# Patient Record
Sex: MALE | Race: ASIAN | NOT HISPANIC OR LATINO | Employment: FULL TIME | URBAN - METROPOLITAN AREA
[De-identification: names, ages, dates, MRNs, and addresses within clinical notes are randomized per-mention and may not be internally consistent; named-entity substitution may affect disease eponyms.]

---

## 2021-11-18 ENCOUNTER — OFFICE VISIT (OUTPATIENT)
Dept: FAMILY MEDICINE CLINIC | Facility: CLINIC | Age: 61
End: 2021-11-18

## 2021-11-18 VITALS
DIASTOLIC BLOOD PRESSURE: 78 MMHG | HEIGHT: 69 IN | SYSTOLIC BLOOD PRESSURE: 128 MMHG | RESPIRATION RATE: 16 BRPM | BODY MASS INDEX: 27.16 KG/M2 | WEIGHT: 183.4 LBS

## 2021-11-18 DIAGNOSIS — Z12.11 COLON CANCER SCREENING: ICD-10-CM

## 2021-11-18 DIAGNOSIS — D50.9 IRON DEFICIENCY ANEMIA, UNSPECIFIED IRON DEFICIENCY ANEMIA TYPE: ICD-10-CM

## 2021-11-18 DIAGNOSIS — E78.9 LIPID DISORDER: ICD-10-CM

## 2021-11-18 DIAGNOSIS — N40.0 BENIGN PROSTATIC HYPERPLASIA WITHOUT LOWER URINARY TRACT SYMPTOMS: ICD-10-CM

## 2021-11-18 PROCEDURE — 99203 OFFICE O/P NEW LOW 30 MIN: CPT | Performed by: INTERNAL MEDICINE

## 2021-11-18 RX ORDER — MULTIVIT WITH MINERALS/LUTEIN
250 TABLET ORAL DAILY
COMMUNITY

## 2021-11-18 NOTE — PROGRESS NOTES
2021    CC: urinary issues (EST CARE)  .        HPI  Urinary Frequency   This is a new problem. The current episode started more than 1 month ago. The problem occurs intermittently. The patient is experiencing no pain. There has been no fever. Associated symptoms include frequency.        Subjective   Herb Melgar is a 61 y.o. male.      The following portions of the patient's history were reviewed and updated as appropriate: allergies, current medications, past family history, past medical history, past social history, past surgical history and problem list.    Problem List  Patient Active Problem List   Diagnosis   • Bone lesion   • Glenoid labrum tear   • Osteoarthritis, shoulder   • Rotator cuff syndrome of right shoulder   • Shoulder pain       Past Medical History  History reviewed. No pertinent past medical history.    Surgical History  Past Surgical History:   Procedure Laterality Date   • DENTAL PROCEDURE  2019       Family History  Family History   Problem Relation Age of Onset   • Kidney disease Mother    • Diabetes Mother    • Heart disease Father    • Other Sister    • Diabetes Brother    • Hypertension Sister        Social History  Social History    Tobacco Use      Smoking status: Former Smoker        Packs/day: 0.25        Years: 22.00        Pack years: 5.5        Types: Cigarettes        Quit date:         Years since quittin.8      Smokeless tobacco: Never Used      Tobacco comment: 1 pack in two weeks       Is the Patient a current tobacco user? No    Allergies  No Known Allergies    Current Medications    Current Outpatient Medications:   •  vitamin C (ASCORBIC ACID) 250 MG tablet, Take 250 mg by mouth Daily., Disp: , Rfl:   •  Zinc Sulfate (ZINC 15 PO), Take  by mouth., Disp: , Rfl:      Review of System  Review of Systems   Constitutional: Negative.    HENT: Negative.    Eyes: Negative.    Respiratory: Negative.    Cardiovascular: Negative.    Gastrointestinal: Negative.     Genitourinary: Positive for frequency.     I have reviewed and confirmed the accuracy of the ROS as documented by the MA/LPN/RN Damon Santiago MD    Vitals:    11/18/21 1308   BP: 128/78   Resp: 16     Body mass index is 27.08 kg/m².    Objective     Physical Exam  Physical Exam  HENT:      Head: Normocephalic and atraumatic.      Right Ear: Tympanic membrane normal.      Left Ear: Tympanic membrane normal.   Cardiovascular:      Rate and Rhythm: Normal rate and regular rhythm.      Pulses: Normal pulses.      Heart sounds: Normal heart sounds.   Pulmonary:      Effort: Pulmonary effort is normal.      Breath sounds: Normal breath sounds.   Abdominal:      General: Abdomen is flat.   Musculoskeletal:      Cervical back: Normal range of motion and neck supple.         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  He was seen in the past at the [facility].  He relates he has been lost to follow-up for a number of years.  He relates concerns about nocturia times several months.            Diagnoses and all orders for this visit:    1. Iron deficiency anemia, unspecified iron deficiency anemia type  Comments:  Evaluation underway  Orders:  -     Comprehensive Metabolic Panel  -     CBC & Differential  -     Hepatitis C Antibody    2. Lipid disorder  Comments:  Evaluation underway  Orders:  -     Lipid Panel With / Chol / HDL Ratio    3. Benign prostatic hyperplasia without lower urinary tract symptoms  Comments:  Evaluation underway  Orders:  -     PSA DIAGNOSTIC ONLY; Future    4. Colon cancer screening  Comments:  Patient is postdate for colon cancer screening  Orders:  -     Ambulatory Referral For Screening Colonoscopy      Plan:  1.)  Comprehensive metabolic profile  2.)  CBC  3.)  PSA  4.)  Urine dip  5.)  Follow-up in 3 to 4 weeks with complete physical examination.       Damon Santiago MD  11/18/2021

## 2021-11-19 ENCOUNTER — PATIENT ROUNDING (BHMG ONLY) (OUTPATIENT)
Dept: FAMILY MEDICINE CLINIC | Facility: CLINIC | Age: 61
End: 2021-11-19

## 2021-11-19 LAB
ALBUMIN SERPL-MCNC: 4.7 G/DL (ref 3.8–4.9)
ALBUMIN/GLOB SERPL: 1.5 {RATIO} (ref 1.2–2.2)
ALP SERPL-CCNC: 76 IU/L (ref 44–121)
ALT SERPL-CCNC: 35 IU/L (ref 0–44)
AST SERPL-CCNC: 30 IU/L (ref 0–40)
BASOPHILS # BLD AUTO: 0.1 X10E3/UL (ref 0–0.2)
BASOPHILS NFR BLD AUTO: 1 %
BILIRUB SERPL-MCNC: 0.4 MG/DL (ref 0–1.2)
BUN SERPL-MCNC: 12 MG/DL (ref 6–24)
BUN/CREAT SERPL: 13 (ref 9–20)
CALCIUM SERPL-MCNC: 9.5 MG/DL (ref 8.7–10.2)
CHLORIDE SERPL-SCNC: 103 MMOL/L (ref 96–106)
CHOLEST SERPL-MCNC: 253 MG/DL (ref 100–199)
CHOLEST/HDLC SERPL: 4.6 RATIO (ref 0–5)
CO2 SERPL-SCNC: 25 MMOL/L (ref 20–29)
CREAT SERPL-MCNC: 0.91 MG/DL (ref 0.76–1.27)
EOSINOPHIL # BLD AUTO: 0.1 X10E3/UL (ref 0–0.4)
EOSINOPHIL NFR BLD AUTO: 1 %
ERYTHROCYTE [DISTWIDTH] IN BLOOD BY AUTOMATED COUNT: 12.7 % (ref 11.6–15.4)
GLOBULIN SER CALC-MCNC: 3.1 G/DL (ref 1.5–4.5)
GLUCOSE SERPL-MCNC: 93 MG/DL (ref 65–99)
HCT VFR BLD AUTO: 47.1 % (ref 37.5–51)
HCV AB S/CO SERPL IA: <0.1 S/CO RATIO (ref 0–0.9)
HDLC SERPL-MCNC: 55 MG/DL
HGB BLD-MCNC: 15.7 G/DL (ref 13–17.7)
IMM GRANULOCYTES # BLD AUTO: 0 X10E3/UL (ref 0–0.1)
IMM GRANULOCYTES NFR BLD AUTO: 0 %
LDLC SERPL CALC-MCNC: 182 MG/DL (ref 0–99)
LYMPHOCYTES # BLD AUTO: 2 X10E3/UL (ref 0.7–3.1)
LYMPHOCYTES NFR BLD AUTO: 31 %
MCH RBC QN AUTO: 29 PG (ref 26.6–33)
MCHC RBC AUTO-ENTMCNC: 33.3 G/DL (ref 31.5–35.7)
MCV RBC AUTO: 87 FL (ref 79–97)
MONOCYTES # BLD AUTO: 0.5 X10E3/UL (ref 0.1–0.9)
MONOCYTES NFR BLD AUTO: 8 %
NEUTROPHILS # BLD AUTO: 3.7 X10E3/UL (ref 1.4–7)
NEUTROPHILS NFR BLD AUTO: 59 %
PLATELET # BLD AUTO: 222 X10E3/UL (ref 150–450)
POTASSIUM SERPL-SCNC: 4.2 MMOL/L (ref 3.5–5.2)
PROT SERPL-MCNC: 7.8 G/DL (ref 6–8.5)
RBC # BLD AUTO: 5.42 X10E6/UL (ref 4.14–5.8)
SODIUM SERPL-SCNC: 141 MMOL/L (ref 134–144)
TRIGL SERPL-MCNC: 94 MG/DL (ref 0–149)
VLDLC SERPL CALC-MCNC: 16 MG/DL (ref 5–40)
WBC # BLD AUTO: 6.4 X10E3/UL (ref 3.4–10.8)

## 2021-11-19 NOTE — PROGRESS NOTES
November 19, 2021    Hello, may I speak with Herb Melgar?    My name is     I am  with [facility]  [facility] [facility] [address] [phone].    Before we get started may I verify your date of birth? 1960    I am calling to officially welcome you to our practice and ask about your recent visit. Is this a good time to talk? My Chart Message sent    Tell me about your visit with us. What things went well?         We're always looking for ways to make our patients' experiences even better. Do you have recommendations on ways we may improve?      Overall were you satisfied with your first visit to our practice?       I appreciate you taking the time to speak with me today. Is there anything else I can do for you?       Thank you, and have a great day.

## 2022-01-11 ENCOUNTER — PREP FOR SURGERY (OUTPATIENT)
Dept: OTHER | Facility: HOSPITAL | Age: 62
End: 2022-01-11

## 2022-01-11 DIAGNOSIS — Z12.11 SCREEN FOR COLON CANCER: Primary | ICD-10-CM

## 2022-01-20 ENCOUNTER — OFFICE VISIT (OUTPATIENT)
Dept: FAMILY MEDICINE CLINIC | Facility: CLINIC | Age: 62
End: 2022-01-20

## 2022-01-20 VITALS
BODY MASS INDEX: 26.07 KG/M2 | DIASTOLIC BLOOD PRESSURE: 80 MMHG | RESPIRATION RATE: 16 BRPM | HEIGHT: 69 IN | WEIGHT: 176 LBS | SYSTOLIC BLOOD PRESSURE: 128 MMHG

## 2022-01-20 DIAGNOSIS — E78.9 LIPID DISORDER: Primary | ICD-10-CM

## 2022-01-20 DIAGNOSIS — N40.0 BENIGN PROSTATIC HYPERPLASIA WITHOUT LOWER URINARY TRACT SYMPTOMS: ICD-10-CM

## 2022-01-20 PROCEDURE — 99396 PREV VISIT EST AGE 40-64: CPT | Performed by: INTERNAL MEDICINE

## 2022-01-20 NOTE — PROGRESS NOTES
2022    CC: Annual Exam (...no other issues)  .        HPI  This patient presents for physical examination.  He was last seen on 2021       Subjective   Herb Melgar is a 62 y.o. male.      The following portions of the patient's history were reviewed and updated as appropriate: allergies, current medications, past family history, past medical history, past social history, past surgical history and problem list.    Problem List  Patient Active Problem List   Diagnosis   • Bone lesion   • Glenoid labrum tear   • Osteoarthritis, shoulder   • Rotator cuff syndrome of right shoulder   • Shoulder pain       Past Medical History  History reviewed. No pertinent past medical history.    Surgical History  Past Surgical History:   Procedure Laterality Date   • DENTAL PROCEDURE  2019       Family History  Family History   Problem Relation Age of Onset   • Kidney disease Mother    • Diabetes Mother    • Heart disease Father    • Other Sister    • Diabetes Brother    • Hypertension Sister        Social History  Social History    Tobacco Use      Smoking status: Former Smoker        Packs/day: 0.25        Years: 22.00        Pack years: 5.5        Types: Cigarettes        Quit date:         Years since quittin.0      Smokeless tobacco: Never Used      Tobacco comment: 1 pack in two weeks       Is the Patient a current tobacco user? No    Allergies  No Known Allergies    Current Medications    Current Outpatient Medications:   •  vitamin C (ASCORBIC ACID) 250 MG tablet, Take 250 mg by mouth Daily., Disp: , Rfl:   •  Zinc Sulfate (ZINC 15 PO), Take  by mouth., Disp: , Rfl:      Review of System  Review of Systems   Constitutional: Negative.    HENT: Negative.    Eyes: Negative.    Respiratory: Negative.    Cardiovascular: Negative.    Gastrointestinal: Negative.    Endocrine: Negative.    Genitourinary: Negative.    Musculoskeletal: Negative.    Skin: Negative.    Allergic/Immunologic: Negative.   Health Maintenance reviewed.     Neurological: Negative.    Hematological: Negative.    Psychiatric/Behavioral: Negative.      I have reviewed and confirmed the accuracy of the ROS as documented by the MA/LPN/RN Damon Santiago MD    Vitals:    01/20/22 1123   BP: 128/80   Resp: 16     Body mass index is 25.99 kg/m².    Objective     Physical Exam  Physical Exam  Vitals and nursing note reviewed.   Constitutional:       Appearance: He is well-developed.   HENT:      Head: Normocephalic and atraumatic.   Eyes:      Conjunctiva/sclera: Conjunctivae normal.   Cardiovascular:      Rate and Rhythm: Normal rate and regular rhythm.      Heart sounds: Normal heart sounds.   Pulmonary:      Effort: Pulmonary effort is normal.      Breath sounds: Normal breath sounds.   Abdominal:      General: Bowel sounds are normal.      Palpations: Abdomen is soft.   Musculoskeletal:         General: Normal range of motion.      Cervical back: Normal range of motion and neck supple.   Skin:     General: Skin is warm and dry.   Neurological:      Mental Status: He is alert and oriented to person, place, and time.   Psychiatric:         Behavior: Behavior normal.         Assessment/Plan      This pleasant 62-year-old presents for physical examination.  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  This pleasant patient has a PhD in Sh[employer].    We discussed at length his laboratory results from 11/18/2021 in particular his LDL cholesterol was elevated greater than 170.  He started on a low-cholesterol diet and already we have seen evidence of his having loss 7 pounds.  He is due today for a repeat lipid profile.  We discussed his labs consisting of comprehensive metabolic profile and CBC all of which were essentially within normal limits.     Patient had a previous colonoscopy in 2013 which was entirely within normal limits.  Having had no problems with his colon , he is eligible for a repeat colonoscopy in 2023.    Importance of regular  physical exercise was broached.  Our goal is for him to receive at least 30 minutes of regular physical exercise and a 5-day week.  He may be making a change in his employment status soon and may have more time available to accomplish this.    Diagnoses and all orders for this visit:    1. Lipid disorder (Primary)  -     Lipid panel      Plan:  1.)  Lipid profile       Damon Santiago MD  01/20/2022

## 2022-01-21 LAB
CHOLEST SERPL-MCNC: 245 MG/DL (ref 100–199)
HDLC SERPL-MCNC: 56 MG/DL
LDLC SERPL CALC-MCNC: 178 MG/DL (ref 0–99)
TRIGL SERPL-MCNC: 64 MG/DL (ref 0–149)
VLDLC SERPL CALC-MCNC: 11 MG/DL (ref 5–40)

## 2022-01-25 LAB
Lab: NORMAL
PSA SERPL-MCNC: 0.6 NG/ML (ref 0–4)
WRITTEN AUTHORIZATION: NORMAL

## 2022-07-20 ENCOUNTER — OFFICE VISIT (OUTPATIENT)
Dept: FAMILY MEDICINE CLINIC | Facility: CLINIC | Age: 62
End: 2022-07-20

## 2022-07-20 VITALS
HEIGHT: 69 IN | WEIGHT: 177.4 LBS | SYSTOLIC BLOOD PRESSURE: 110 MMHG | BODY MASS INDEX: 26.28 KG/M2 | DIASTOLIC BLOOD PRESSURE: 78 MMHG | RESPIRATION RATE: 16 BRPM

## 2022-07-20 DIAGNOSIS — E78.9 LIPID DISORDER: Primary | ICD-10-CM

## 2022-07-20 PROCEDURE — 99214 OFFICE O/P EST MOD 30 MIN: CPT | Performed by: INTERNAL MEDICINE

## 2022-07-20 NOTE — PROGRESS NOTES
2022    CC: Hyperlipidemia (F/U..No other issues)  .        HPI  This pleasant patient presents at this time for follow-up of hyperlipidemia.       Subjective   Herb Melgar is a 62 y.o. male.      The following portions of the patient's history were reviewed and updated as appropriate: allergies, current medications, past family history, past medical history, past social history, past surgical history and problem list.    Problem List  Patient Active Problem List   Diagnosis   • Bone lesion   • Glenoid labrum tear   • Osteoarthritis, shoulder   • Rotator cuff syndrome of right shoulder   • Shoulder pain       Past Medical History  History reviewed. No pertinent past medical history.    Surgical History  Past Surgical History:   Procedure Laterality Date   • DENTAL PROCEDURE  2019       Family History  Family History   Problem Relation Age of Onset   • Kidney disease Mother    • Diabetes Mother    • Heart disease Father    • Other Sister    • Diabetes Brother    • Hypertension Sister        Social History  Social History    Tobacco Use      Smoking status: Former Smoker        Packs/day: 0.25        Years: 22.00        Pack years: 5.5        Types: Cigarettes        Quit date:         Years since quittin.5      Smokeless tobacco: Never Used      Tobacco comment: 1 pack in two weeks       Is the Patient a current tobacco user? No    Allergies  No Known Allergies    Current Medications    Current Outpatient Medications:   •  vitamin C (ASCORBIC ACID) 250 MG tablet, Take 250 mg by mouth Daily., Disp: , Rfl:   •  Zinc Sulfate (ZINC 15 PO), Take  by mouth., Disp: , Rfl:      Review of System  Review of Systems   Constitutional: Negative.    HENT: Negative.    Eyes: Negative.    Respiratory: Negative.    Cardiovascular: Negative.    Gastrointestinal: Negative.    Endocrine: Negative.    Genitourinary: Negative.      I have reviewed and confirmed the accuracy of the ROS as documented by the MA/LPN/RN  Damon Santiago MD    Vitals:    07/20/22 0815   BP: 110/78   Resp: 16     Body mass index is 26.2 kg/m².    Objective     Physical Exam  Physical Exam  HENT:      Head: Normocephalic.   Cardiovascular:      Rate and Rhythm: Normal rate and regular rhythm.      Pulses: Normal pulses.      Heart sounds: Normal heart sounds.   Pulmonary:      Effort: Pulmonary effort is normal.      Breath sounds: Normal breath sounds.   Musculoskeletal:      Cervical back: Normal range of motion.   Neurological:      General: No focal deficit present.      Mental Status: He is alert and oriented to person, place, and time.         Assessment & Plan      This pleasant patient presents at this time for follow-up for hyperlipidemia.  He was last seen in January 2022 [place] found that hisLDL was very elevated at 178.  His previous level was also elevated at 182.  Patient triglycerides however were normal at 64.  He relates that he is not quite sure if he was truly fasting at that visit.    Patient is very active playing pickle ball and tennis several days a week.  He has had no chest pain or shortness of breath..  Diagnoses and all orders for this visit:    1. Lipid disorder (Primary)  -     Lipid Panel         Plan:  1.)  Fasting lipid level  2.)  Schedule for Medicare wellness review review after January 23.    Damon Santiago MD  07/20/2022  Answers for HPI/ROS submitted by the patient on 7/20/2022  What is the primary reason for your visit?: Other  Please describe your symptoms.: Follow up with my physical check-up. Check on my cholesterol  Have you had these symptoms before?: No  How long have you been having these symptoms?: 1-4 days  Please list any medications you are currently taking for this condition.: Allegra, vitamin c  Please describe any probable cause for these symptoms. : N/A

## 2022-07-21 LAB
CHOLEST SERPL-MCNC: 227 MG/DL (ref 100–199)
HDLC SERPL-MCNC: 59 MG/DL
LDLC SERPL CALC-MCNC: 153 MG/DL (ref 0–99)
TRIGL SERPL-MCNC: 85 MG/DL (ref 0–149)
VLDLC SERPL CALC-MCNC: 15 MG/DL (ref 5–40)

## 2022-09-29 ENCOUNTER — TELEPHONE (OUTPATIENT)
Dept: FAMILY MEDICINE CLINIC | Facility: CLINIC | Age: 62
End: 2022-09-29

## 2022-09-29 NOTE — TELEPHONE ENCOUNTER
Caller: Herb Melgar    Relationship to patient: Self    Best call back number: [phone]    Date of exposure: UNKNOWN    Date of positive COVID19 test: 09/28    Date if possible COVID19 exposure: UNKNOWN    COVID19 symptoms: COUGH, SOME DRAINAGE     Date of initial quarantine: 09/28    Additional information or concerns: PATIENT WANTED TO LET DR CHUNG KNOW HE TESTED POSITIVE FOR COVID.  SO FAR IS HAVING MILD SYMPTOMS THAT STARTED YESTERDAY.    PATIENT MAY NEED TO HAVE A DOCTORS NOTE TO RETURN TO WORK.    PATIENT PLANS TO QUARANTINE UNTIL OCT 3 AND IF NOT OTHER SYMPTOMS BEGIN TO PROLONG QUARANTINE HE [LANS TO RETURN TO WORK BY OCT 4.    What is the patients preferred pharmacy: [facility] [id number] - [place] [address] [address] AT [facility] [address] - [phone] [facility] [phone]   [phone]

## 2023-07-25 ENCOUNTER — CLINICAL SUPPORT (OUTPATIENT)
Dept: FAMILY MEDICINE CLINIC | Facility: CLINIC | Age: 63
End: 2023-07-25
Payer: COMMERCIAL

## 2023-07-25 DIAGNOSIS — Z23 NEED FOR TDAP VACCINATION: Primary | ICD-10-CM

## 2023-07-25 PROCEDURE — 90471 IMMUNIZATION ADMIN: CPT | Performed by: INTERNAL MEDICINE

## 2023-07-25 PROCEDURE — 90715 TDAP VACCINE 7 YRS/> IM: CPT | Performed by: INTERNAL MEDICINE

## 2023-08-08 ENCOUNTER — PREP FOR SURGERY (OUTPATIENT)
Dept: OTHER | Facility: HOSPITAL | Age: 63
End: 2023-08-08
Payer: COMMERCIAL

## 2023-08-08 DIAGNOSIS — Z12.11 SCREENING FOR MALIGNANT NEOPLASM OF COLON: Primary | ICD-10-CM

## 2023-08-11 PROBLEM — Z12.11 SCREENING FOR MALIGNANT NEOPLASM OF COLON: Status: ACTIVE | Noted: 2023-08-11

## 2023-08-31 ENCOUNTER — OFFICE VISIT (OUTPATIENT)
Dept: FAMILY MEDICINE CLINIC | Facility: CLINIC | Age: 63
End: 2023-08-31
Payer: COMMERCIAL

## 2023-08-31 VITALS
BODY MASS INDEX: 26.51 KG/M2 | DIASTOLIC BLOOD PRESSURE: 78 MMHG | HEIGHT: 69 IN | SYSTOLIC BLOOD PRESSURE: 120 MMHG | WEIGHT: 179 LBS

## 2023-08-31 DIAGNOSIS — R16.0 HYPODENSE MASS OF LIVER: ICD-10-CM

## 2023-08-31 DIAGNOSIS — L98.9 SKIN LESION: Primary | ICD-10-CM

## 2023-08-31 DIAGNOSIS — Z13.220 SCREENING FOR HYPERLIPIDEMIA: ICD-10-CM

## 2023-08-31 PROCEDURE — 99214 OFFICE O/P EST MOD 30 MIN: CPT | Performed by: INTERNAL MEDICINE

## 2023-08-31 RX ORDER — ASPIRIN 81 MG/1
TABLET ORAL
Qty: 90 TABLET | Refills: 3 | Status: SHIPPED | OUTPATIENT
Start: 2023-08-31

## 2023-08-31 RX ORDER — ROSUVASTATIN CALCIUM 10 MG/1
40 TABLET, COATED ORAL DAILY
Qty: 90 TABLET | Refills: 3 | Status: SHIPPED | OUTPATIENT
Start: 2023-08-31

## 2023-08-31 NOTE — PROGRESS NOTES
08/31/2023    Assessment & Plan   This 63-year-old presents today for discussion regarding his calcium CT scan which was done On 7/17/2023.  The results of the scan showed that the patient had 4 vessels in law involved with possible calcium his total score was a A2 N4.  The total Agatston CAC score was 287 with 86 adjusted CAC score percentile of 60.    The vessel scoring showed left main 59, , circumflex 25 with a RCA of 71.  Recommendation is for moderate to high intensity statin plus aspirin at 81 mg the risk is moderately increased.    We discussed this for 30 minutes with the patient.  We will repeat his cholesterol in 4 to 6 weeks.  Incidental note was made of a 1.5 hepatic hypodensity which was felt to be slightly greater than fluid attenuation the feeling from radiologist that this may represent slightly complex cyst but we will pursue this more with a liver MRI.      Diagnoses and all orders for this visit:    1. Skin lesion (Primary)  -     Ambulatory Referral to Dermatology    2. Hypodense mass of liver  -     MRI abdomen w wo contrast; Future    3. Screening for hyperlipidemia  -     Lipid Panel; Future    Other orders  -     rosuvastatin (Crestor) 10 MG tablet; Take 4 tablets by mouth Daily.  Dispense: 90 tablet; Refill: 3  -     aspirin 81 MG EC tablet; 1 tablet after breakfast  Dispense: 90 tablet; Refill: 3      Plan:  1.)  Crestor 10 mg 1 tab p.o. every daily  2.)  Aspirin 81 mg 1 tab p.o. every morning  3.)  Repeat cholesterol in 6 to 8 weeks  4.)  MRI of the abdomen to evaluate possible hepatic cyst       CC: Hyperlipidemia (F/U.  Spot on left forearm---no other issues)  .        HPI  History of Present Illness     Subjective   Herb Melgar is a 63 y.o. male.      The following portions of the patient's history were reviewed and updated as appropriate: allergies, current medications, past family history, past medical history, past social history, past surgical history, and problem  list.    Problem List  Patient Active Problem List   Diagnosis    Bone lesion    Glenoid labrum tear    Osteoarthritis, shoulder    Rotator cuff syndrome of right shoulder    Shoulder pain    Screening for malignant neoplasm of colon       Past Medical History  Past Medical History:   Diagnosis Date    Hyperlipidemia        Surgical History  Past Surgical History:   Procedure Laterality Date    COLONOSCOPY  2013    Dr. Jarad Howe completed the Colonoscopy Report ([facility])    COLONOSCOPY N/A 2023    Procedure: COLONOSCOPY to cecum with cold forcep polypectomies;  Surgeon: Costa Painting MD;  Location: [facility] ENDOSCOPY;  Service: General;  Laterality: N/A;  pre- screening  post- polyps, hemorrhoids    DENTAL PROCEDURE         Family History  Family History   Problem Relation Age of Onset    Kidney disease Mother         My mom will be 90 years old this August with renal failure    Diabetes Mother     Heart disease Father          last July 3, 1969    Other Sister         Brain Tumor    Diabetes Brother         Youngest brother    Hypertension Sister         She has maintenance meds. Aby is my younger suster    Diabetes Maternal Grandfather         He  with diabetes complications. He was 69 years old    Diabetes Brother         He also has high cholesterol issues    Heart disease Brother         Recently  3 months ago, 2023 (58 years old) due to massive heart attack    Heart disease Maternal Aunt         Deaseased due to heart failure    Kidney disease Maternal Uncle         He was on dialysis. He  when he was 57 years old       Social History  Social History    Tobacco Use      Smoking status: Former        Packs/day: 0.25        Years: 22.00        Pack years: 5.5        Types: Cigarettes        Start date: 1980        Quit date: 2002        Years since quittin.7      Smokeless tobacco: Never      Tobacco comments: 1 pack in two weeks       Is  the Patient a current tobacco user? No    Allergies  No Known Allergies    Current Medications    Current Outpatient Medications:     aspirin 81 MG EC tablet, 1 tablet after breakfast, Disp: 90 tablet, Rfl: 3    rosuvastatin (Crestor) 10 MG tablet, Take 4 tablets by mouth Daily., Disp: 90 tablet, Rfl: 3     Review of System  Review of Systems  I have reviewed and confirmed the accuracy of the ROS as documented by the MA/LPN/RN Damon Santiago MD    Vitals:    08/31/23 1132   BP: 120/78     Body mass index is 26.43 kg/m².    Objective     Physical Exam  Physical Exam        Damon Santiago MD  08/31/2023

## 2023-09-12 ENCOUNTER — ANESTHESIA EVENT (OUTPATIENT)
Dept: GASTROENTEROLOGY | Facility: HOSPITAL | Age: 63
End: 2023-09-12
Payer: COMMERCIAL

## 2023-09-12 ENCOUNTER — HOSPITAL ENCOUNTER (OUTPATIENT)
Facility: HOSPITAL | Age: 63
Setting detail: HOSPITAL OUTPATIENT SURGERY
Discharge: HOME | End: 2023-09-12
Attending: SURGERY | Admitting: SURGERY
Payer: COMMERCIAL

## 2023-09-12 ENCOUNTER — ANESTHESIA (OUTPATIENT)
Dept: GASTROENTEROLOGY | Facility: HOSPITAL | Age: 63
End: 2023-09-12
Payer: COMMERCIAL

## 2023-09-12 VITALS
DIASTOLIC BLOOD PRESSURE: 73 MMHG | WEIGHT: 176 LBS | OXYGEN SATURATION: 99 % | RESPIRATION RATE: 18 BRPM | HEIGHT: 69 IN | SYSTOLIC BLOOD PRESSURE: 109 MMHG | BODY MASS INDEX: 26.07 KG/M2 | HEART RATE: 70 BPM

## 2023-09-12 DIAGNOSIS — Z12.11 SCREENING FOR MALIGNANT NEOPLASM OF COLON: ICD-10-CM

## 2023-09-12 PROCEDURE — 88305 TISSUE EXAM BY PATHOLOGIST: CPT | Performed by: SURGERY

## 2023-09-12 PROCEDURE — 45380 COLONOSCOPY AND BIOPSY: CPT | Performed by: SURGERY

## 2023-09-12 PROCEDURE — 25010000002 PROPOFOL 10 MG/ML EMULSION: Performed by: NURSE ANESTHETIST, CERTIFIED REGISTERED

## 2023-09-12 RX ORDER — FLUMAZENIL 0.1 MG/ML
0.2 INJECTION INTRAVENOUS AS NEEDED
Status: DISCONTINUED | OUTPATIENT
Start: 2023-09-12 | End: 2023-09-12 | Stop reason: HOSPADM

## 2023-09-12 RX ORDER — PROPOFOL 10 MG/ML
VIAL (ML) INTRAVENOUS AS NEEDED
Status: DISCONTINUED | OUTPATIENT
Start: 2023-09-12 | End: 2023-09-12 | Stop reason: SURG

## 2023-09-12 RX ORDER — HYDRALAZINE HYDROCHLORIDE 20 MG/ML
10 INJECTION INTRAMUSCULAR; INTRAVENOUS
Status: DISCONTINUED | OUTPATIENT
Start: 2023-09-12 | End: 2023-09-12 | Stop reason: HOSPADM

## 2023-09-12 RX ORDER — FENTANYL CITRATE 50 UG/ML
25 INJECTION, SOLUTION INTRAMUSCULAR; INTRAVENOUS
Status: DISCONTINUED | OUTPATIENT
Start: 2023-09-12 | End: 2023-09-12 | Stop reason: HOSPADM

## 2023-09-12 RX ORDER — DROPERIDOL 2.5 MG/ML
0.62 INJECTION, SOLUTION INTRAMUSCULAR; INTRAVENOUS ONCE AS NEEDED
Status: DISCONTINUED | OUTPATIENT
Start: 2023-09-12 | End: 2023-09-12 | Stop reason: HOSPADM

## 2023-09-12 RX ORDER — LIDOCAINE HYDROCHLORIDE 20 MG/ML
INJECTION, SOLUTION INFILTRATION; PERINEURAL AS NEEDED
Status: DISCONTINUED | OUTPATIENT
Start: 2023-09-12 | End: 2023-09-12 | Stop reason: SURG

## 2023-09-12 RX ORDER — DIPHENHYDRAMINE HYDROCHLORIDE 50 MG/ML
12.5 INJECTION INTRAMUSCULAR; INTRAVENOUS
Status: DISCONTINUED | OUTPATIENT
Start: 2023-09-12 | End: 2023-09-12 | Stop reason: HOSPADM

## 2023-09-12 RX ORDER — EPHEDRINE SULFATE 50 MG/ML
10 INJECTION, SOLUTION INTRAVENOUS ONCE AS NEEDED
Status: DISCONTINUED | OUTPATIENT
Start: 2023-09-12 | End: 2023-09-12 | Stop reason: HOSPADM

## 2023-09-12 RX ORDER — ONDANSETRON 2 MG/ML
4 INJECTION INTRAMUSCULAR; INTRAVENOUS ONCE AS NEEDED
Status: DISCONTINUED | OUTPATIENT
Start: 2023-09-12 | End: 2023-09-12 | Stop reason: HOSPADM

## 2023-09-12 RX ORDER — SODIUM CHLORIDE, SODIUM LACTATE, POTASSIUM CHLORIDE, CALCIUM CHLORIDE 600; 310; 30; 20 MG/100ML; MG/100ML; MG/100ML; MG/100ML
30 INJECTION, SOLUTION INTRAVENOUS CONTINUOUS PRN
Status: DISCONTINUED | OUTPATIENT
Start: 2023-09-12 | End: 2023-09-12 | Stop reason: HOSPADM

## 2023-09-12 RX ORDER — PROPOFOL 10 MG/ML
VIAL (ML) INTRAVENOUS CONTINUOUS PRN
Status: DISCONTINUED | OUTPATIENT
Start: 2023-09-12 | End: 2023-09-12 | Stop reason: SURG

## 2023-09-12 RX ORDER — NALOXONE HCL 0.4 MG/ML
0.2 VIAL (ML) INJECTION AS NEEDED
Status: DISCONTINUED | OUTPATIENT
Start: 2023-09-12 | End: 2023-09-12 | Stop reason: HOSPADM

## 2023-09-12 RX ORDER — LABETALOL HYDROCHLORIDE 5 MG/ML
10 INJECTION, SOLUTION INTRAVENOUS
Status: DISCONTINUED | OUTPATIENT
Start: 2023-09-12 | End: 2023-09-12 | Stop reason: HOSPADM

## 2023-09-12 RX ORDER — SODIUM CHLORIDE 9 MG/ML
40 INJECTION, SOLUTION INTRAVENOUS AS NEEDED
Status: DISCONTINUED | OUTPATIENT
Start: 2023-09-12 | End: 2023-09-12 | Stop reason: HOSPADM

## 2023-09-12 RX ORDER — SODIUM CHLORIDE 0.9 % (FLUSH) 0.9 %
10 SYRINGE (ML) INJECTION EVERY 12 HOURS SCHEDULED
Status: DISCONTINUED | OUTPATIENT
Start: 2023-09-12 | End: 2023-09-12 | Stop reason: HOSPADM

## 2023-09-12 RX ORDER — FENTANYL CITRATE 50 UG/ML
50 INJECTION, SOLUTION INTRAMUSCULAR; INTRAVENOUS
Status: DISCONTINUED | OUTPATIENT
Start: 2023-09-12 | End: 2023-09-12 | Stop reason: HOSPADM

## 2023-09-12 RX ORDER — SODIUM CHLORIDE 0.9 % (FLUSH) 0.9 %
10 SYRINGE (ML) INJECTION AS NEEDED
Status: DISCONTINUED | OUTPATIENT
Start: 2023-09-12 | End: 2023-09-12 | Stop reason: HOSPADM

## 2023-09-12 RX ADMIN — SODIUM CHLORIDE, POTASSIUM CHLORIDE, SODIUM LACTATE AND CALCIUM CHLORIDE 30 ML/HR: 600; 310; 30; 20 INJECTION, SOLUTION INTRAVENOUS at 09:38

## 2023-09-12 RX ADMIN — Medication 100 MG: at 10:41

## 2023-09-12 RX ADMIN — PROPOFOL 180 MCG/KG/MIN: 10 INJECTION, EMULSION INTRAVENOUS at 10:41

## 2023-09-12 RX ADMIN — LIDOCAINE HYDROCHLORIDE 60 MG: 20 INJECTION, SOLUTION INFILTRATION; PERINEURAL at 10:41

## 2023-09-12 NOTE — OP NOTE
Colonoscopy Procedure Note  Herb Melgar  1960  Date of Procedure: 09/12/23    Pre-operative Diagnosis:    Encounter for screening colonoscopy    Post-operative Diagnosis:  Descending colon polyp x2, grade 1 nonbleeding internal hemorrhoids    Procedure: Colonoscopy to cecum with cold forcep biopsy of descending colon polyp x2    Findings/Treatments:   2 separate sessile 1 to 2 mm polyps in descending colon       Recommendations:   7 to 10 years pending final pathology.  I will call in 3 to 10 days with a final recommendation.    Surgeon: Costa Painting MD    Anesthetic: MAC per Derek Dewitt MD      Procedure Details:    MAC anesthesia was induced.  A digital rectal exam was performed along with visual inspection of the anus. The 180 Colonoscope was inserted into the rectum and advanced to the cecum, with relative ease.  Cecum was identified by the appendiceal orifice and the ileocecal valve and photographed for documentation.       A careful inspection was made as the scope was withdrawn, including a retroflexed view of the rectum.  In the descending colon there were 2 separate sessile polyps identified.  These were tiny in nature measuring 1 to 2 mm.  They were removed in their entirety with cold forcep. Retroflexion in the rectum revealed grade 1 nonbleeding internal hemorrhoids. Prep quality was excellent.      Costa Painting M.D.  General, Endoscopic, and Robotic Surgery  [facility] ([facility]    [facility], [address]  [place], [place], [address]  P: [phone]  F: [phone]

## 2023-09-12 NOTE — DISCHARGE INSTRUCTIONS

## 2023-09-12 NOTE — ANESTHESIA POSTPROCEDURE EVALUATION
"Patient: Herb Melgar    Procedure Summary       Date: 09/12/23 Room / Location: [facility] ENDOSCOPY 1 /  [facility]    Anesthesia Start: 1038 Anesthesia Stop: 1103    Procedure: COLONOSCOPY to cecum with cold forcep polypectomies Diagnosis:       Screening for malignant neoplasm of colon      (Screening for malignant neoplasm of colon [Z12.11])    Surgeons: Costa Painting MD Provider: Derek Dewitt MD    Anesthesia Type: MAC ASA Status: 2            Anesthesia Type: MAC    Vitals  Vitals Value Taken Time   BP 96/67 09/12/23 1115   Temp     Pulse 67 09/12/23 1115   Resp 16 09/12/23 1115   SpO2 96 % 09/12/23 1115           Post Anesthesia Care and Evaluation    Patient location during evaluation: bedside  Pain management: adequate    Airway patency: patent  Anesthetic complications: No anesthetic complications    Cardiovascular status: acceptable  Respiratory status: acceptable  Hydration status: acceptable    Comments: *BP 96/67   Pulse 67   Resp 16   Ht 175.3 cm (69\")   Wt 79.8 kg (176 lb)   SpO2 96%   BMI 25.99 kg/m²       "

## 2023-09-12 NOTE — H&P
SURGERY  Herb VIDAL Melgar  1960    09/12/23    HPI: 63 y.o. male here for screening colonoscopy.  Last reported colonoscopy in .  He denies history of polyps or family history of colon cancer.    Past Medical History:   Diagnosis Date    Hyperlipidemia        Past Surgical History:   Procedure Laterality Date    COLONOSCOPY  2013    Dr. Jarad Howe completed the Colonoscopy Report ([facility])    DENTAL PROCEDURE  2019       has No Known Allergies.       Medication List        ASK your doctor about these medications      aspirin 81 MG EC tablet  1 tablet after breakfast     rosuvastatin 10 MG tablet  Commonly known as: Crestor  Take 4 tablets by mouth Daily.              Family History   Problem Relation Age of Onset    Kidney disease Mother         My mom will be 90 years old this August with renal failure    Diabetes Mother     Heart disease Father          last July 3, 1969    Other Sister         Brain Tumor    Diabetes Brother         Youngest brother    Hypertension Sister         She has maintenance meds. Aby is my younger suster    Diabetes Maternal Grandfather         He  with diabetes complications. He was 69 years old    Diabetes Brother         He also has high cholesterol issues    Heart disease Brother         Recently  3 months ago, 2023 (58 years old) due to massive heart attack    Heart disease Maternal Aunt         Deaseased due to heart failure    Kidney disease Maternal Uncle         He was on dialysis. He  when he was 57 years old       Social History     Socioeconomic History    Marital status:    Tobacco Use    Smoking status: Former     Packs/day: 0.25     Years: 22.00     Pack years: 5.50     Types: Cigarettes     Start date: 1980     Quit date: 2002     Years since quittin.7    Smokeless tobacco: Never    Tobacco comments:     1 pack in two weeks   Substance and Sexual Activity    Alcohol use: Yes     Alcohol/week:  3.0 standard drinks     Types: 2 Cans of beer, 1 Shots of liquor per week    Drug use: Never    Sexual activity: Yes     Partners: Female     Birth control/protection: Condom       Vitals:    09/12/23 0930   BP: 131/79   Pulse: 63   Resp: 16   SpO2: 98%       Body mass index is 25.99 kg/m².    Physical Exam    General: No acute distress  Lungs: No labored breathing, Pulse oximetry on room air is 98%.  Heart: RRR  Abdo: Soft  Mental:  Awake, alert, and oriented      Assessment/Plan  Encounter for Screening/Surveillance Colonoscopy  Proceed with screening colonoscopy.  Risk, benefits discussed including risk of perforation, bleeding.    Costa Painting MD  10:36 EDT

## 2023-09-12 NOTE — ANESTHESIA PREPROCEDURE EVALUATION
Anesthesia Evaluation     no history of anesthetic complications:   NPO Solid Status: > 8 hours  NPO Liquid Status: > 2 hours           Airway   Mallampati: II  Neck ROM: full  no difficulty expected  Dental - normal exam     Pulmonary - normal exam   (+) a smoker Former,  (-) COPD, asthma, sleep apnea    PE comment: nonlabored  Cardiovascular - normal exam  Exercise tolerance: good (4-7 METS)    Rhythm: regular  Rate: normal    (+) hyperlipidemia  (-) hypertension, valvular problems/murmurs, past MI, CAD, dysrhythmias, angina      Neuro/Psych- negative ROS  (-) seizures, TIA, CVA  GI/Hepatic/Renal/Endo - negative ROS   (-) GERD, liver disease, no renal disease, diabetes, no thyroid disorder    Musculoskeletal     (+) arthralgias  Abdominal    Substance History      OB/GYN          Other   arthritis,                   Anesthesia Plan    ASA 2     MAC       Anesthetic plan, risks, benefits, and alternatives have been provided, discussed and informed consent has been obtained with: patient.    CODE STATUS:

## 2023-09-14 LAB
LAB AP CASE REPORT: NORMAL
LAB AP DIAGNOSIS COMMENT: NORMAL
PATH REPORT.FINAL DX SPEC: NORMAL
PATH REPORT.GROSS SPEC: NORMAL

## 2023-09-15 ENCOUNTER — TELEPHONE (OUTPATIENT)
Dept: SURGERY | Facility: CLINIC | Age: 63
End: 2023-09-15
Payer: COMMERCIAL

## 2023-09-18 ENCOUNTER — TELEPHONE (OUTPATIENT)
Dept: FAMILY MEDICINE CLINIC | Facility: CLINIC | Age: 63
End: 2023-09-18

## 2023-09-18 NOTE — TELEPHONE ENCOUNTER
Caller: Herb Melgar    Relationship: Self    Best call back number: [phone]    What medications are you currently taking:   Current Outpatient Medications on File Prior to Visit   Medication Sig Dispense Refill    aspirin 81 MG EC tablet 1 tablet after breakfast 90 tablet 3    rosuvastatin (Crestor) 10 MG tablet Take 4 tablets by mouth Daily. 90 tablet 3     No current facility-administered medications on file prior to visit.      When did you start taking these medications: 9/1/23 ( HAS 5 DAYS LEFT ON HAND )     Which medication are you concerned about: rosuvastatin (Crestor) 10 MG tablet     Who prescribed you this medication: DR ARIANA CHUNG     What are your concerns: FOAMY URINE     How long have you had these concerns: A COUPLE OF WEEKS    PATIENT IS OF  DECENT FROM [facility]- NOT SURE IF THAT CORRELATES AT ALL TO THE FOAM IN THE URINE.     PATIENT IS STILL TAKING HIS DOSES AS NORMAL, AWAITING OUR CALL.     PLEASE GIVE PATIENT A CALLBACK.

## 2023-09-20 DIAGNOSIS — Z13.6 SCREENING FOR HYPERTENSION: Primary | ICD-10-CM

## 2023-09-21 ENCOUNTER — TELEPHONE (OUTPATIENT)
Dept: FAMILY MEDICINE CLINIC | Facility: CLINIC | Age: 63
End: 2023-09-21
Payer: COMMERCIAL

## 2023-09-22 NOTE — TELEPHONE ENCOUNTER
I called the patient to check on his condition and to see if he was able to get laboratory test done as we had requested.  The call however went to voicemail and the voicemail was full.

## 2023-09-27 DIAGNOSIS — M62.82 NON-TRAUMATIC RHABDOMYOLYSIS: Primary | ICD-10-CM

## 2023-10-12 ENCOUNTER — LAB (OUTPATIENT)
Dept: FAMILY MEDICINE CLINIC | Facility: CLINIC | Age: 63
End: 2023-10-12
Payer: COMMERCIAL

## 2023-10-12 DIAGNOSIS — Z13.220 SCREENING FOR HYPERLIPIDEMIA: ICD-10-CM

## 2023-10-15 DIAGNOSIS — E78.5 HYPERLIPIDEMIA, UNSPECIFIED HYPERLIPIDEMIA TYPE: Primary | ICD-10-CM

## 2023-11-06 ENCOUNTER — OFFICE VISIT (OUTPATIENT)
Dept: CARDIOLOGY | Facility: CLINIC | Age: 63
End: 2023-11-06
Payer: COMMERCIAL

## 2023-11-06 VITALS
BODY MASS INDEX: 26.07 KG/M2 | WEIGHT: 176 LBS | DIASTOLIC BLOOD PRESSURE: 82 MMHG | HEIGHT: 69 IN | HEART RATE: 67 BPM | SYSTOLIC BLOOD PRESSURE: 136 MMHG

## 2023-11-06 DIAGNOSIS — E78.5 HYPERLIPIDEMIA LDL GOAL <100: Primary | ICD-10-CM

## 2023-11-06 DIAGNOSIS — Z78.9 STATIN INTOLERANCE: ICD-10-CM

## 2023-11-06 PROCEDURE — 99203 OFFICE O/P NEW LOW 30 MIN: CPT | Performed by: INTERNAL MEDICINE

## 2023-11-06 PROCEDURE — 93000 ELECTROCARDIOGRAM COMPLETE: CPT | Performed by: INTERNAL MEDICINE

## 2023-11-06 RX ORDER — BEMPEDOIC ACID AND EZETIMIBE 180; 10 MG/1; MG/1
1 TABLET, FILM COATED ORAL DAILY
Qty: 30 TABLET | Refills: 6 | Status: SHIPPED | OUTPATIENT
Start: 2023-11-06

## 2023-11-06 NOTE — PROGRESS NOTES
Subjective:        [facility]  Cardiology Consult Note    Patient Identification:  Name: Herb Melgar  Age: 63 y.o.  Sex: male  :  1960  MRN: [id number]             CC  HYPERLIPIDEMIA  STOPPED DUE TO HIGH CHOL  F/H      History of Present Illness:   63-year-old male with hyperlipidemia statin intolerance here for the cardiac evaluation as well as establishment of the care also has a strong family history for atherosclerosis    Comorbid cardiac risk factors:     Past Medical History:  Past Medical History:   Diagnosis Date    Hyperlipidemia      Past Surgical History:  Past Surgical History:   Procedure Laterality Date    COLONOSCOPY  2013    Dr. Jarad Howe completed the Colonoscopy Report (LEC)    COLONOSCOPY N/A 2023    Procedure: COLONOSCOPY to cecum with cold forcep polypectomies;  Surgeon: Costa Painting MD;  Location: [facility] ENDOSCOPY;  Service: General;  Laterality: N/A;  pre- screening  post- polyps, hemorrhoids    DENTAL PROCEDURE        Allergies:  No Known Allergies  Home Meds:  (Not in a hospital admission)    Current Meds:     Current Outpatient Medications:     aspirin 81 MG EC tablet, 1 tablet after breakfast, Disp: 90 tablet, Rfl: 3    Bempedoic Acid-Ezetimibe (Nexlizet) 180-10 MG tablet, Take 1 tablet by mouth Daily., Disp: 30 tablet, Rfl: 6  Social History:   Social History     Tobacco Use    Smoking status: Former     Packs/day: 0.25     Years: 22.00     Additional pack years: 0.00     Total pack years: 5.50     Types: Cigarettes     Start date: 1980     Quit date: 2002     Years since quittin.8    Smokeless tobacco: Never    Tobacco comments:     1 pack in two weeks   Substance Use Topics    Alcohol use: Yes     Alcohol/week: 3.0 standard drinks of alcohol     Types: 2 Cans of beer, 1 Shots of liquor per week      Family History:  Family History   Problem Relation Age of Onset    Kidney disease Mother         My mom is 90 years  old this with renal disease    Diabetes Mother     Heart disease Father          last July 3, 1969    Other Sister         Brain Tumor ( benign)    Diabetes Brother         Youngest brother    Hypertension Sister         She has maintenance meds. Aby is my younger sister    Diabetes Maternal Grandfather         He  with diabetes complications. He was 69 years old    Diabetes Brother         He also has high cholesterol issues    Heart disease Brother         Recently  2023 (58 years old) due to cardiac arrest    Heart disease Maternal Aunt          due to heart failure. She was 75    Kidney disease Maternal Uncle         He was on dialysis. He  when he was 57 years old        Review of Systems    Constitutional: No weakness,fatigue, fever, rigors, chills   Eyes: No vision changes, eye pain   ENT/oropharynx: No difficulty swallowing, sore throat, epistaxis, changes in hearing   Cardiovascular: No chest pain, chest tightness, palpitations, paroxysmal nocturnal dyspnea, orthopnea, diaphoresis, dizziness / syncopal episode   Respiratory: No shortness of breath, dyspnea on exertion, cough, wheezing hemoptysis   Gastrointestinal: No abdominal pain, nausea, vomiting, diarrhea, bloody stools   Genitourinary: No hematuria, dysuria   Neurological: No headache, tremors, numbness,  one-sided weakness    Musculoskeletal: No cramps, myalgias,  joint pain, joint swelling   Integument: No rash, edema           Constitutional:       Physical Exam   General:  Appears in no acute distress  Eyes: PERTL,  HEENT:  No JVD. Thyroid not visibly enlarged. No mucosal pallor or cyanosis  Respiratory: Respirations regular and unlabored at rest. BBS with good air entry in all fields. No crackles, rubs or wheezes auscultated  Cardiovascular: S1S2 Regular rate and rhythm. No murmur, rub or gallop auscultated. No carotid bruits. DP/PT pulses    . No pretibial pitting edema  Gastrointestinal: Abdomen  soft, flat, non tender. Bowel sounds present. No hepatosplenomegaly. No ascites  Musculoskeletal: GIPSON x4. No abnormal movements  Extremities: No digital clubbing or cyanosis  Skin: Color pink. Skin warm and dry to touch. No rashes  No xanthoma  Neuro: AAO x3 CN II-XII grossly intact            ECG 12 Lead    Date/Time: 11/6/2023 1:02 PM  Performed by: Olga North MD    Authorized by: Olga North MD  Comparison: compared with previous ECG   Similar to previous ECG  Rhythm: sinus rhythm    Clinical impression: non-specific ECG              Cardiographics  ECG:     Telemetry:    Echocardiogram:     Imaging  Chest X-ray:     Lab Review               @[email or web address]@              Assessment:/ Recommendations / Plan:     * No active hospital problems. *                   ICD-10-CM ICD-9-CM   1. Hyperlipidemia LDL goal <100  E78.5 272.4   2. Statin intolerance  Z78.9 995.27     1. Hyperlipidemia LDL goal <100  Considering the patient's symptoms as well as clinical situation and  EKG findings, along with cardiac risk factors, ischemic workup is necessary to rule out ischemic cardiomyopathy, stress induced arrhythmias, and functional capacity for diagnosis as well as prognostic consideration    Considering patient's medical condition as well as the risk factors, patient will require echocardiogram for further evaluation for the LV function, four-chamber evaluation, including the pressures, valvular function and  pericardial disease and pericardial effusion    - Adult Transthoracic Echo Complete W/ Cont if Necessary Per Protocol  - Treadmill Stress Test    2. Statin intolerance    - Adult Transthoracic Echo Complete W/ Cont if Necessary Per Protocol  - Treadmill Stress Test      ECHO, ETT    NEXLIZET    FOLLOW UP    Labs/tests ordered for am      Olga North MD  11/15/2023, 13:44 EST      EMR Dragon/Transcription:   Dictated utilizing Dragon dictation

## 2023-11-08 ENCOUNTER — PATIENT ROUNDING (BHMG ONLY) (OUTPATIENT)
Dept: CARDIOLOGY | Facility: CLINIC | Age: 63
End: 2023-11-08
Payer: COMMERCIAL

## 2023-11-09 NOTE — PROGRESS NOTES
November 8, 2023    Tell me about your visit with us. What things went well?         We're always looking for ways to make our patients' experiences even better. Do you have recommendations on ways we may improve?      Overall were you satisfied with your first visit to our practice?        I appreciate you taking the time to speak with me today. Is there anything else I can do for you?       Thank you, and have a great day.

## 2023-11-15 ENCOUNTER — HOSPITAL ENCOUNTER (OUTPATIENT)
Dept: CARDIOLOGY | Facility: HOSPITAL | Age: 63
Discharge: HOME | End: 2023-11-15
Payer: COMMERCIAL

## 2023-11-15 VITALS
SYSTOLIC BLOOD PRESSURE: 145 MMHG | HEIGHT: 69 IN | BODY MASS INDEX: 25.8 KG/M2 | WEIGHT: 174.16 LBS | HEART RATE: 74 BPM | DIASTOLIC BLOOD PRESSURE: 80 MMHG

## 2023-11-15 VITALS
DIASTOLIC BLOOD PRESSURE: 69 MMHG | BODY MASS INDEX: 25.77 KG/M2 | SYSTOLIC BLOOD PRESSURE: 146 MMHG | HEIGHT: 69 IN | RESPIRATION RATE: 16 BRPM | OXYGEN SATURATION: 99 % | WEIGHT: 174 LBS | HEART RATE: 78 BPM

## 2023-11-15 LAB
AORTIC DIMENSIONLESS INDEX: 0.8 (DI)
ASCENDING AORTA: 2.8 CM
BH CV ECHO MEAS - ACS: 2 CM
BH CV ECHO MEAS - AO MAX PG: 11.7 MMHG
BH CV ECHO MEAS - AO MEAN PG: 6 MMHG
BH CV ECHO MEAS - AO ROOT DIAM: 3.4 CM
BH CV ECHO MEAS - AO V2 MAX: 171 CM/SEC
BH CV ECHO MEAS - AO V2 VTI: 30.6 CM
BH CV ECHO MEAS - AVA(I,D): 2.42 CM2
BH CV ECHO MEAS - EDV(CUBED): 117.6 ML
BH CV ECHO MEAS - EDV(MOD-SP2): 75 ML
BH CV ECHO MEAS - EDV(MOD-SP4): 84 ML
BH CV ECHO MEAS - EF(MOD-BP): 64.4 %
BH CV ECHO MEAS - EF(MOD-SP2): 65.3 %
BH CV ECHO MEAS - EF(MOD-SP4): 63.1 %
BH CV ECHO MEAS - ESV(CUBED): 32.8 ML
BH CV ECHO MEAS - ESV(MOD-SP2): 26 ML
BH CV ECHO MEAS - ESV(MOD-SP4): 31 ML
BH CV ECHO MEAS - FS: 34.7 %
BH CV ECHO MEAS - IVS/LVPW: 1 CM
BH CV ECHO MEAS - IVSD: 1.2 CM
BH CV ECHO MEAS - LAT PEAK E' VEL: 11.5 CM/SEC
BH CV ECHO MEAS - LV MASS(C)D: 226.4 GRAMS
BH CV ECHO MEAS - LV MAX PG: 6.1 MMHG
BH CV ECHO MEAS - LV MEAN PG: 3 MMHG
BH CV ECHO MEAS - LV V1 MAX: 123 CM/SEC
BH CV ECHO MEAS - LV V1 VTI: 23.6 CM
BH CV ECHO MEAS - LVIDD: 4.9 CM
BH CV ECHO MEAS - LVIDS: 3.2 CM
BH CV ECHO MEAS - LVOT AREA: 3.1 CM2
BH CV ECHO MEAS - LVOT DIAM: 2 CM
BH CV ECHO MEAS - LVPWD: 1.2 CM
BH CV ECHO MEAS - MED PEAK E' VEL: 8.6 CM/SEC
BH CV ECHO MEAS - MV A DUR: 0.11 SEC
BH CV ECHO MEAS - MV A MAX VEL: 69.2 CM/SEC
BH CV ECHO MEAS - MV DEC SLOPE: 185 CM/SEC2
BH CV ECHO MEAS - MV DEC TIME: 0.27 SEC
BH CV ECHO MEAS - MV E MAX VEL: 59.1 CM/SEC
BH CV ECHO MEAS - MV E/A: 0.85
BH CV ECHO MEAS - MV MAX PG: 2.4 MMHG
BH CV ECHO MEAS - MV MEAN PG: 1 MMHG
BH CV ECHO MEAS - MV P1/2T: 103.2 MSEC
BH CV ECHO MEAS - MV V2 VTI: 22 CM
BH CV ECHO MEAS - MVA(P1/2T): 2.13 CM2
BH CV ECHO MEAS - MVA(VTI): 3.4 CM2
BH CV ECHO MEAS - PA ACC TIME: 0.15 SEC
BH CV ECHO MEAS - PA V2 MAX: 145 CM/SEC
BH CV ECHO MEAS - PULM A REVS DUR: 0.11 SEC
BH CV ECHO MEAS - PULM A REVS VEL: 48.1 CM/SEC
BH CV ECHO MEAS - PULM DIAS VEL: 33.5 CM/SEC
BH CV ECHO MEAS - PULM S/D: 1.28
BH CV ECHO MEAS - PULM SYS VEL: 43 CM/SEC
BH CV ECHO MEAS - QP/QS: 0.44
BH CV ECHO MEAS - RAP SYSTOLE: 8 MMHG
BH CV ECHO MEAS - RV MAX PG: 6.7 MMHG
BH CV ECHO MEAS - RV V1 MAX: 129 CM/SEC
BH CV ECHO MEAS - RV V1 VTI: 24.3 CM
BH CV ECHO MEAS - RVOT DIAM: 1.3 CM
BH CV ECHO MEAS - RVSP: 41 MMHG
BH CV ECHO MEAS - SV(LVOT): 74.1 ML
BH CV ECHO MEAS - SV(MOD-SP2): 49 ML
BH CV ECHO MEAS - SV(MOD-SP4): 53 ML
BH CV ECHO MEAS - SV(RVOT): 32.3 ML
BH CV ECHO MEAS - TAPSE (>1.6): 2.32 CM
BH CV ECHO MEAS - TR MAX PG: 32.9 MMHG
BH CV ECHO MEAS - TR MAX VEL: 287 CM/SEC
BH CV ECHO MEASUREMENTS AVERAGE E/E' RATIO: 5.88
BH CV XLRA - RV BASE: 2.7 CM
BH CV XLRA - RV LENGTH: 7.5 CM
BH CV XLRA - TDI S': 15.4 CM/SEC
LEFT ATRIUM VOLUME INDEX: 13.4 ML/M2
SINUS: 2.9 CM
STJ: 2.11 CM

## 2023-11-15 PROCEDURE — 93306 TTE W/DOPPLER COMPLETE: CPT | Performed by: INTERNAL MEDICINE

## 2023-11-15 PROCEDURE — 93306 TTE W/DOPPLER COMPLETE: CPT

## 2023-11-15 PROCEDURE — 93017 CV STRESS TEST TRACING ONLY: CPT

## 2023-11-20 ENCOUNTER — OFFICE VISIT (OUTPATIENT)
Dept: CARDIOLOGY | Facility: CLINIC | Age: 63
End: 2023-11-20
Payer: COMMERCIAL

## 2023-11-20 ENCOUNTER — OFFICE VISIT (OUTPATIENT)
Dept: FAMILY MEDICINE CLINIC | Facility: CLINIC | Age: 63
End: 2023-11-20
Payer: COMMERCIAL

## 2023-11-20 VITALS
HEIGHT: 69 IN | WEIGHT: 177 LBS | SYSTOLIC BLOOD PRESSURE: 128 MMHG | BODY MASS INDEX: 26.22 KG/M2 | DIASTOLIC BLOOD PRESSURE: 80 MMHG

## 2023-11-20 VITALS
HEART RATE: 75 BPM | DIASTOLIC BLOOD PRESSURE: 78 MMHG | SYSTOLIC BLOOD PRESSURE: 130 MMHG | BODY MASS INDEX: 25.92 KG/M2 | HEIGHT: 69 IN | WEIGHT: 175 LBS

## 2023-11-20 DIAGNOSIS — Z13.220 SCREENING FOR HYPERLIPIDEMIA: ICD-10-CM

## 2023-11-20 DIAGNOSIS — Z78.9 STATIN INTOLERANCE: ICD-10-CM

## 2023-11-20 DIAGNOSIS — E78.5 HYPERLIPIDEMIA, UNSPECIFIED HYPERLIPIDEMIA TYPE: Primary | ICD-10-CM

## 2023-11-20 DIAGNOSIS — E78.5 HYPERLIPIDEMIA LDL GOAL <100: Primary | ICD-10-CM

## 2023-11-20 DIAGNOSIS — L98.9 SKIN LESION: ICD-10-CM

## 2023-11-20 LAB
BH CV STRESS BP STAGE 1: NORMAL
BH CV STRESS BP STAGE 2: NORMAL
BH CV STRESS BP STAGE 3: NORMAL
BH CV STRESS BP STAGE 4: NORMAL
BH CV STRESS DURATION MIN STAGE 1: 3
BH CV STRESS DURATION MIN STAGE 2: 3
BH CV STRESS DURATION MIN STAGE 3: 3
BH CV STRESS DURATION MIN STAGE 4: 4
BH CV STRESS DURATION SEC STAGE 1: 13
BH CV STRESS DURATION SEC STAGE 2: 0
BH CV STRESS DURATION SEC STAGE 3: 0
BH CV STRESS DURATION SEC STAGE 4: 7
BH CV STRESS GRADE STAGE 1: 10
BH CV STRESS GRADE STAGE 2: 12
BH CV STRESS GRADE STAGE 3: 14
BH CV STRESS GRADE STAGE 4: 16
BH CV STRESS HR STAGE 1: 111
BH CV STRESS HR STAGE 2: 133
BH CV STRESS HR STAGE 3: 154
BH CV STRESS HR STAGE 4: 174
BH CV STRESS METS STAGE 1: 4.6
BH CV STRESS METS STAGE 2: 7.1
BH CV STRESS METS STAGE 3: 10.2
BH CV STRESS METS STAGE 4: 14
BH CV STRESS PROTOCOL 1: NORMAL
BH CV STRESS RECOVERY BP: NORMAL MMHG
BH CV STRESS RECOVERY HR: 114 BPM
BH CV STRESS RECOVERY O2: 99 %
BH CV STRESS SPEED STAGE 1: 1.7
BH CV STRESS SPEED STAGE 2: 2.5
BH CV STRESS SPEED STAGE 3: 3.4
BH CV STRESS SPEED STAGE 4: 4.2
BH CV STRESS STAGE 1: 1
BH CV STRESS STAGE 2: 2
BH CV STRESS STAGE 3: 3
BH CV STRESS STAGE 4: 4
MAXIMAL PREDICTED HEART RATE: 159 BPM
PERCENT MAX PREDICTED HR: 109.43 %
STRESS BASELINE BP: NORMAL MMHG
STRESS BASELINE HR: 77 BPM
STRESS O2 SAT REST: 99 %
STRESS PERCENT HR: 129 %
STRESS POST ESTIMATED WORKLOAD: 14 METS
STRESS POST EXERCISE DUR MIN: 13 MIN
STRESS POST EXERCISE DUR SEC: 20 SEC
STRESS POST PEAK BP: NORMAL MMHG
STRESS POST PEAK HR: 174 BPM
STRESS TARGET HR: 135 BPM

## 2023-11-20 PROCEDURE — 99214 OFFICE O/P EST MOD 30 MIN: CPT | Performed by: INTERNAL MEDICINE

## 2023-11-20 PROCEDURE — 99212 OFFICE O/P EST SF 10 MIN: CPT | Performed by: INTERNAL MEDICINE

## 2023-11-20 NOTE — PROGRESS NOTES
Results   Subjective:        Herb Melgar is a 63 y.o. male who here for follow up    CC  Follow-up statin intolerance and hyperlipidemia  HPI  63-year-old male with statin intolerance here for the follow-up     Problems Addressed this Visit          Allergies and Adverse Reactions    Statin intolerance       Cardiac and Vasculature    Hyperlipidemia LDL goal <100 - Primary     Diagnoses         Codes Comments    Hyperlipidemia LDL goal <100    -  Primary ICD-10-CM: E78.5  ICD-9-CM: 272.4     Statin intolerance     ICD-10-CM: Z78.9  ICD-9-CM: 995.27           .    The following portions of the patient's history were reviewed and updated as appropriate: allergies, current medications, past family history, past medical history, past social history, past surgical history and problem list.    Past Medical History:   Diagnosis Date    Hyperlipidemia      reports that he quit smoking about 21 years ago. His smoking use included cigarettes. He started smoking about 43 years ago. He has a 5.50 pack-year smoking history. He has never used smokeless tobacco. He reports current alcohol use of about 3.0 standard drinks of alcohol per week. He reports that he does not use drugs.   Family History   Problem Relation Age of Onset    Kidney disease Mother         My mom is 90 years old this with renal disease    Diabetes Mother     Heart disease Father          last July 3, 1969    Other Sister         Brain Tumor ( benign)    Diabetes Brother         Youngest brother    Hypertension Sister         She has maintenance meds. Aby is my younger sister    Diabetes Maternal Grandfather         He  with diabetes complications. He was 69 years old    Diabetes Brother         He also has high cholesterol issues    Heart disease Brother         Recently  2023 (58 years old) due to cardiac arrest    Heart disease Maternal Aunt          due to heart failure. She was 75    Kidney disease  "Maternal Uncle         He was on dialysis. He  when he was 57 years old       Review of Systems  Constitutional: No wt loss, fever, fatigue  Gastrointestinal: No nausea, abdominal pain  Behavioral/Psych: No insomnia or anxiety   Cardiovascular no chest pains or tightness in the chest  Objective:       Physical Exam           Physical Exam  /78   Pulse 75   Ht 175.3 cm (69\")   Wt 79.4 kg (175 lb)   BMI 25.84 kg/m²     General appearance: No acute changes   Eyes: Sclerae conjunctivae normal, pupils reactive   HENT: Atraumatic; oropharynx clear with moist mucous membranes and no mucosal ulcerations;  Neck: Trachea midline; NECK, supple, no thyromegaly or lymphadenopathy   Lungs: Normal size and shape, normal breath sounds, equal distribution of air, no rales and rhonchi   CV: S1-S2 regular, no murmurs, no rub, no gallop   Abdomen: Soft, nontender; no masses , no abnormal abdominal sounds   Extremities: No deformity , normal color , no peripheral edema   Skin: Normal temperature, turgor and texture; no rash, ulcers  Psych: Appropriate affect, alert and oriented to person, place and time           Procedures  No ECG evidence of myocardial ischemia. Negative clinical evidence of myocardial ischemia. Findings consistent with a normal ECG stress test.   Interpretation Summary         Left ventricular ejection fraction appears to be 61 - 65%.    Left ventricular diastolic function was normal.    Estimated right ventricular systolic pressure from tricuspid regurgitation is mildly elevated (35-45 mmHg  Echocardiogram:        Current Outpatient Medications:     aspirin 81 MG EC tablet, 1 tablet after breakfast, Disp: 90 tablet, Rfl: 3    Bempedoic Acid-Ezetimibe (Nexlizet) 180-10 MG tablet, Take 1 tablet by mouth Daily., Disp: 30 tablet, Rfl: 6   Assessment:        [unfilled]            Plan:            ICD-10-CM ICD-9-CM   1. Hyperlipidemia LDL goal <100  E78.5 272.4   2. Statin intolerance  Z78.9 995.27     1. " Hyperlipidemia LDL goal <100  Being managed by PCP    2. Statin intolerance  Being managed by PCP      Specificity and sensitivity of the stress test/ cardiac workup has been explained. Pt has been explained if  Symptoms continue please go to ER, and further w/p will be required.    Also explained this does not rule out coronary artery disease or the future events, continue to emphasize on risk reductions for coronary artery disease    Pt also advised to contact PCP for other causes of symptoms    1 YR  COUNSELING:    Herb Carroll was given to patient for the following topics: diagnostic results, risk factor reductions, impressions, risks and benefits of treatment options and importance of treatment compliance .       SMOKING COUNSELING:        Dictated using Dragon dictation

## 2023-11-20 NOTE — PROGRESS NOTES
11/20/2023    Assessment & Plan     This 63-year-old patient presents today for follow-up he was referred to Dr. Brett Barlow about 2 weeks or so ago for evaluation of family history of atherosclerosis.  The patient has never manifested any chest pain or shortness of breath but did relate he was having some achiness with starting on statins.  He had had a calcium CT test done about 4 to 6 weeks or so ago and which suggested that he start aspirin plus statin because of the  total Agatston calcium score of 282 and a CAC-DRS category score of a 2/N4.  Patient was also started on Nexlizet by Dr. North.    Patient's cardiac work-up from Dr. Brett Chaney shows an echocardiogram within normal limits and exercise treadmill test not diagnostic of any ischemia.  The patient relates that with his new medication he is not feeling the myalgia that he had in the past with the statins and will await a repeat lipid profile and January 2 help gauge its effectiveness.  We will also touch bases with Dr. North regarding the patient's continued need for aspirin plus cholesterol-lowering medication.    The patient is also scheduled for a follow-up with dermatology regarding a skin biopsy on his left forearm.      Diagnoses and all orders for this visit:    1. Hyperlipidemia, unspecified hyperlipidemia type (Primary)    2. Skin lesion    3. Statin intolerance      Plan:  1.)  Follow-up in 2 to 3 months  2.)  Repeat fasting lipid level and 8 weeks.  3.)  Will touch base with Dr. North regarding the patient's calcium CT           CC: Hyperlipidemia (Follow up from Cardiology.---no other issues)  .        HPI  History of Present Illness     Subjective   Herb Melgar is a 63 y.o. male.      The following portions of the patient's history were reviewed and updated as appropriate: allergies, current medications, past family history, past medical history, past social history, past surgical history, and problem  list.    Problem List  Patient Active Problem List   Diagnosis    Bone lesion    Glenoid labrum tear    Osteoarthritis, shoulder    Rotator cuff syndrome of right shoulder    Shoulder pain    Screening for malignant neoplasm of colon    Hyperlipidemia LDL goal <100    Statin intolerance       Past Medical History  Past Medical History:   Diagnosis Date    Hyperlipidemia        Surgical History  Past Surgical History:   Procedure Laterality Date    COLONOSCOPY  2013    Dr. Jarad Howe completed the Colonoscopy Report (LEC)    COLONOSCOPY N/A 2023    Procedure: COLONOSCOPY to cecum with cold forcep polypectomies;  Surgeon: Costa Painting MD;  Location: [facility] ENDOSCOPY;  Service: General;  Laterality: N/A;  pre- screening  post- polyps, hemorrhoids    DENTAL PROCEDURE         Family History  Family History   Problem Relation Age of Onset    Kidney disease Mother         My mom is 90 years old this with renal disease    Diabetes Mother     Heart disease Father          last July 3, 1969    Other Sister         Brain Tumor ( benign)    Diabetes Brother         Youngest brother    Hypertension Sister         She has maintenance meds. Aby is my younger sister    Diabetes Maternal Grandfather         He  with diabetes complications. He was 69 years old    Diabetes Brother         He also has high cholesterol issues    Heart disease Brother         Recently  2023 (58 years old) due to cardiac arrest    Heart disease Maternal Aunt          due to heart failure. She was 75    Kidney disease Maternal Uncle         He was on dialysis. He  when he was 57 years old       Social History  Social History    Tobacco Use      Smoking status: Former        Packs/day: 0.25        Years: 22.00        Additional pack years: 0.00        Total pack years: 5.50        Types: Cigarettes        Start date: 1980        Quit date: 2002        Years since quitting:  21.8      Smokeless tobacco: Never      Tobacco comments: 1 pack in two weeks       Is the Patient a current tobacco user? No    Allergies  No Known Allergies    Current Medications    Current Outpatient Medications:     aspirin 81 MG EC tablet, 1 tablet after breakfast, Disp: 90 tablet, Rfl: 3    Bempedoic Acid-Ezetimibe (Nexlizet) 180-10 MG tablet, Take 1 tablet by mouth Daily., Disp: 30 tablet, Rfl: 6     Review of System  Review of Systems  I have reviewed and confirmed the accuracy of the ROS as documented by the MA/LPN/RN Damon Santiago MD    Vitals:    11/20/23 1514   BP: 128/80     Body mass index is 26.14 kg/m².    Objective     Physical Exam  Physical Exam        Damon Santiago MD  11/20/2023  Answers submitted by the patient for this visit:  Primary Reason for Visit (Submitted on 11/13/2023)  What is the primary reason for your visit?: Other  Other (Submitted on 11/13/2023)  Please describe your symptoms.: This doctor’s appointment was scheduled to review my recent lab results, , I still have foamy urine but no pain while urinating.  Have you had these symptoms before?: No  How long have you been having these symptoms?: Greater than 2 weeks  Please list any medications you are currently taking for this condition.: Baby aspirin  Please describe any probable cause for these symptoms. : Probaly when I started taking statin for three weeks  and baby aspirin. I stopped taking statin per doctor’s order a few weeks ago but I continue taking baby aspirin every day

## 2024-01-11 LAB
CHOLEST SERPL-MCNC: 159 MG/DL (ref 0–200)
HDLC SERPL-MCNC: 61 MG/DL (ref 40–60)
LDLC SERPL CALC-MCNC: 91 MG/DL (ref 0–100)
TRIGL SERPL-MCNC: 30 MG/DL (ref 0–150)
VLDLC SERPL CALC-MCNC: 7 MG/DL (ref 5–40)

## 2024-01-18 ENCOUNTER — OFFICE VISIT (OUTPATIENT)
Dept: FAMILY MEDICINE CLINIC | Facility: CLINIC | Age: 64
End: 2024-01-18
Payer: COMMERCIAL

## 2024-01-18 VITALS
HEIGHT: 69 IN | BODY MASS INDEX: 25.62 KG/M2 | WEIGHT: 173 LBS | SYSTOLIC BLOOD PRESSURE: 122 MMHG | DIASTOLIC BLOOD PRESSURE: 78 MMHG

## 2024-01-18 DIAGNOSIS — E53.8 B12 DEFICIENCY: Primary | ICD-10-CM

## 2024-01-18 DIAGNOSIS — E78.5 HYPERLIPIDEMIA, UNSPECIFIED HYPERLIPIDEMIA TYPE: ICD-10-CM

## 2024-01-18 DIAGNOSIS — Z78.9 STATIN INTOLERANCE: ICD-10-CM

## 2024-01-18 PROCEDURE — 99213 OFFICE O/P EST LOW 20 MIN: CPT | Performed by: INTERNAL MEDICINE

## 2024-01-18 NOTE — PROGRESS NOTES
01/18/2024    Assessment & Plan     this pleasant 64-year-old retired patient presents at this time for follow-up.  In the interim of visits he has been seen by cardiology in the person of Dr. Foss.  He was started on BEMPEDO/EZEM TIMIBE after development demonstrating intolerance to statins.  In the interim his LDL has decreased from 181-91 as of 3 weeks ago.    He is very active working out daily with pickleball and tennis and walking.  He is lost 4 pounds in the interim of visits.    He also relates he is drastically reduced his intake of meats using a more plant-based diet but not exclusively so.      Diagnoses and all orders for this visit:    1. B12 deficiency (Primary)  -     Vitamin B12; Future    2. Hyperlipidemia, unspecified hyperlipidemia type    3. Statin intolerance         Plan:  1.)  Follow-up in 6 months with comprehensive metabolic profile and physical examination.         CC: Hyperlipidemia (Follow up labs---no other issues)  .        HPI  Hyperlipidemia  Pertinent negatives include no chest pain or shortness of breath.        Subjective   Herb Melgar is a 64 y.o. male.      The following portions of the patient's history were reviewed and updated as appropriate: allergies, current medications, past family history, past medical history, past social history, past surgical history, and problem list.    Problem List  Patient Active Problem List   Diagnosis    Bone lesion    Glenoid labrum tear    Osteoarthritis, shoulder    Rotator cuff syndrome of right shoulder    Shoulder pain    Screening for malignant neoplasm of colon    Hyperlipidemia LDL goal <100    Statin intolerance       Past Medical History  Past Medical History:   Diagnosis Date    Hyperlipidemia 2022       Surgical History  Past Surgical History:   Procedure Laterality Date    COLONOSCOPY  07/26/2013    Dr. Jarad Howe completed the Colonoscopy Report (LEC)    COLONOSCOPY N/A 9/12/2023    Procedure: COLONOSCOPY to  cecum with cold forcep polypectomies;  Surgeon: Costa Painting MD;  Location: [facility] ENDOSCOPY;  Service: General;  Laterality: N/A;  pre- screening  post- polyps, hemorrhoids    DENTAL PROCEDURE  2019       Family History  Family History   Problem Relation Age of Onset    Kidney disease Mother         My mom is 90 years old this with renal disease    Diabetes Mother     Heart disease Father          last July 3, 1969    Other Sister         Brain Tumor ( benign)    Diabetes Brother         Youngest brother    Hypertension Sister         She has maintenance meds. Aby is my younger sister    Diabetes Maternal Grandfather         He  with diabetes complications. He was 69 years old    Diabetes Brother         He also has high cholesterol issues    Heart disease Brother         Recently  2023 (58 years old) due to cardiac arrest    Heart disease Maternal Aunt          due to heart failure. She was 75    Kidney disease Maternal Uncle         He was on dialysis. He  when he was 57 years old       Social History  Social History    Tobacco Use      Smoking status: Former        Packs/day: 0.25        Years: 22.00        Additional pack years: 0.00        Total pack years: 5.50        Types: Cigarettes        Start date: 1980        Quit date: 2002        Years since quittin.1      Smokeless tobacco: Never      Tobacco comments: 1 pack in two weeks       Is the Patient a current tobacco user? No    Allergies  No Known Allergies    Current Medications    Current Outpatient Medications:     aspirin 81 MG EC tablet, 1 tablet after breakfast, Disp: 90 tablet, Rfl: 3    Bempedoic Acid-Ezetimibe (Nexlizet) 180-10 MG tablet, Take 1 tablet by mouth Daily., Disp: 30 tablet, Rfl: 6     Review of System  Review of Systems   Constitutional:  Negative for chills and fever.   Respiratory:  Negative for cough and shortness of breath.    Cardiovascular:  Negative for chest pain  and palpitations.   Gastrointestinal:  Negative for constipation, diarrhea, nausea and vomiting.   Neurological:  Negative for dizziness and headache.     I have reviewed and confirmed the accuracy of the ROS as documented by the MA/LPN/RN Damon Santiago MD    Vitals:    01/18/24 0804   BP: 122/78     Body mass index is 25.55 kg/m².    Objective     Physical Exam  Physical Exam  Constitutional:       General: He is not in acute distress.     Appearance: Normal appearance.   HENT:      Head: Normocephalic and atraumatic.   Cardiovascular:      Rate and Rhythm: Normal rate and regular rhythm.   Pulmonary:      Effort: Pulmonary effort is normal. No respiratory distress.      Breath sounds: Normal breath sounds. No wheezing, rhonchi or rales.   Neurological:      General: No focal deficit present.      Mental Status: He is alert and oriented to person, place, and time.   Psychiatric:         Mood and Affect: Mood normal.         Behavior: Behavior normal.         Thought Content: Thought content normal.         Judgment: Judgment normal.             Damon Santiago MD  01/18/2024

## 2024-08-06 ENCOUNTER — OFFICE VISIT (OUTPATIENT)
Dept: FAMILY MEDICINE CLINIC | Facility: CLINIC | Age: 64
End: 2024-08-06
Payer: COMMERCIAL

## 2024-08-06 VITALS
DIASTOLIC BLOOD PRESSURE: 80 MMHG | HEIGHT: 69 IN | SYSTOLIC BLOOD PRESSURE: 140 MMHG | WEIGHT: 170 LBS | BODY MASS INDEX: 25.18 KG/M2

## 2024-08-06 DIAGNOSIS — Z00.00 ENCOUNTER FOR PHYSICAL EXAMINATION: ICD-10-CM

## 2024-08-06 DIAGNOSIS — Z13.6 SCREENING FOR HYPERTENSION: ICD-10-CM

## 2024-08-06 DIAGNOSIS — D50.9 IRON DEFICIENCY ANEMIA, UNSPECIFIED IRON DEFICIENCY ANEMIA TYPE: ICD-10-CM

## 2024-08-06 DIAGNOSIS — D51.9 ANEMIA DUE TO VITAMIN B12 DEFICIENCY, UNSPECIFIED B12 DEFICIENCY TYPE: ICD-10-CM

## 2024-08-06 DIAGNOSIS — E78.5 HYPERLIPIDEMIA, UNSPECIFIED HYPERLIPIDEMIA TYPE: ICD-10-CM

## 2024-08-06 DIAGNOSIS — D51.9 ANEMIA DUE TO VITAMIN B12 DEFICIENCY, UNSPECIFIED B12 DEFICIENCY TYPE: Primary | ICD-10-CM

## 2024-08-06 LAB
ALBUMIN SERPL-MCNC: 5.1 G/DL (ref 3.5–5.2)
ALBUMIN/GLOB SERPL: 2.2 G/DL
ALP SERPL-CCNC: 54 U/L (ref 39–117)
ALT SERPL-CCNC: 22 U/L (ref 1–41)
AST SERPL-CCNC: 31 U/L (ref 1–40)
BASOPHILS # BLD AUTO: 0.02 10*3/MM3 (ref 0–0.2)
BASOPHILS NFR BLD AUTO: 0.4 % (ref 0–1.5)
BILIRUB SERPL-MCNC: 0.9 MG/DL (ref 0–1.2)
BUN SERPL-MCNC: 15 MG/DL (ref 8–23)
BUN/CREAT SERPL: 15 (ref 7–25)
CALCIUM SERPL-MCNC: 9.5 MG/DL (ref 8.6–10.5)
CHLORIDE SERPL-SCNC: 102 MMOL/L (ref 98–107)
CHOLEST SERPL-MCNC: 159 MG/DL (ref 0–200)
CHOLEST/HDLC SERPL: 2.52 {RATIO}
CO2 SERPL-SCNC: 27.1 MMOL/L (ref 22–29)
CREAT SERPL-MCNC: 1 MG/DL (ref 0.76–1.27)
EGFRCR SERPLBLD CKD-EPI 2021: 85.1 ML/MIN/1.73
EOSINOPHIL # BLD AUTO: 0.08 10*3/MM3 (ref 0–0.4)
EOSINOPHIL NFR BLD AUTO: 1.6 % (ref 0.3–6.2)
ERYTHROCYTE [DISTWIDTH] IN BLOOD BY AUTOMATED COUNT: 12.7 % (ref 12.3–15.4)
FOLATE SERPL-MCNC: >20 NG/ML (ref 4.78–24.2)
GLOBULIN SER CALC-MCNC: 2.3 GM/DL
GLUCOSE SERPL-MCNC: 87 MG/DL (ref 65–99)
HCT VFR BLD AUTO: 44.8 % (ref 37.5–51)
HDLC SERPL-MCNC: 63 MG/DL (ref 40–60)
HGB BLD-MCNC: 14.7 G/DL (ref 13–17.7)
IMM GRANULOCYTES # BLD AUTO: 0.01 10*3/MM3 (ref 0–0.05)
IMM GRANULOCYTES NFR BLD AUTO: 0.2 % (ref 0–0.5)
LDLC SERPL CALC-MCNC: 89 MG/DL (ref 0–100)
LYMPHOCYTES # BLD AUTO: 1.54 10*3/MM3 (ref 0.7–3.1)
LYMPHOCYTES NFR BLD AUTO: 31.2 % (ref 19.6–45.3)
MCH RBC QN AUTO: 28.4 PG (ref 26.6–33)
MCHC RBC AUTO-ENTMCNC: 32.8 G/DL (ref 31.5–35.7)
MCV RBC AUTO: 86.5 FL (ref 79–97)
MONOCYTES # BLD AUTO: 0.45 10*3/MM3 (ref 0.1–0.9)
MONOCYTES NFR BLD AUTO: 9.1 % (ref 5–12)
NEUTROPHILS # BLD AUTO: 2.83 10*3/MM3 (ref 1.7–7)
NEUTROPHILS NFR BLD AUTO: 57.5 % (ref 42.7–76)
NRBC BLD AUTO-RTO: 0 /100 WBC (ref 0–0.2)
PLATELET # BLD AUTO: 240 10*3/MM3 (ref 140–450)
POTASSIUM SERPL-SCNC: 4 MMOL/L (ref 3.5–5.2)
PROT SERPL-MCNC: 7.4 G/DL (ref 6–8.5)
RBC # BLD AUTO: 5.18 10*6/MM3 (ref 4.14–5.8)
SODIUM SERPL-SCNC: 140 MMOL/L (ref 136–145)
TRIGL SERPL-MCNC: 29 MG/DL (ref 0–150)
VIT B12 SERPL-MCNC: 855 PG/ML (ref 211–946)
VLDLC SERPL CALC-MCNC: 7 MG/DL (ref 5–40)
WBC # BLD AUTO: 4.93 10*3/MM3 (ref 3.4–10.8)

## 2024-08-06 PROCEDURE — 99396 PREV VISIT EST AGE 40-64: CPT | Performed by: INTERNAL MEDICINE

## 2024-08-06 RX ORDER — LANOLIN ALCOHOL/MO/W.PET/CERES
1000 CREAM (GRAM) TOPICAL DAILY
COMMUNITY

## 2024-08-06 NOTE — PROGRESS NOTES
08/06/2024    Assessment & Plan       This pleasant 64-year-old presents at this time for follow-up of This pleasant 64-year-old presents at this time for physical examination.  He relates he is feeling well having had no problems in the interim of visits.  He plays racquetball regularly at least 3-4 times a week.  He has had no athletic injuries with the exception of torn rotator cuff several years ago.    Regarding anticipatory guidance we discussed the importance of keeping his LDL cholesterol less than 100.  He has been seen in the past by Dr. Mary Arteaga, cardiologist.  The patient had intolerance of statin and was placed on Nexlizet.  In the interim of visits he also started taking vitamin B12.  Patient's last LDL done in January was 91.    We gave the patient a low-cholesterol diet sheet for implementation and our direction.    Patient also related that he wanted a area on his left forearm which is status post skin biopsy done approximately a year ago reevaluated.  It shows no evidence of any atypia.  We discussed the path report from the lesion done by dermatopathology.  It was benign.  Diagnoses and all orders for this visit:    1. Anemia due to vitamin B12 deficiency, unspecified B12 deficiency type (Primary)  -     Vitamin B12; Future  -     Folate; Future    2. Iron deficiency anemia, unspecified iron deficiency anemia type  -     CBC & Differential    3. Hyperlipidemia, unspecified hyperlipidemia type  -     Lipid Panel With / Chol / HDL Ratio    4. Screening for hypertension  -     Comprehensive Metabolic Panel    5. Encounter for physical examination    Other orders  -     Folate  -     Vitamin B12                  CC: Annual Exam  .        HPI  History of Present Illness     Subjective   Herb Melgar is a 64 y.o. male.      The following portions of the patient's history were reviewed and updated as appropriate: allergies, current medications, past family history, past medical history, past  social history, past surgical history, and problem list.    Problem List  Patient Active Problem List   Diagnosis    Bone lesion    Glenoid labrum tear    Osteoarthritis, shoulder    Rotator cuff syndrome of right shoulder    Shoulder pain    Screening for malignant neoplasm of colon    Hyperlipidemia LDL goal <100    Statin intolerance       Past Medical History  Past Medical History:   Diagnosis Date    Hyperlipidemia        Surgical History  Past Surgical History:   Procedure Laterality Date    COLONOSCOPY  2013    Dr. Jarad Howe completed the Colonoscopy Report ([facility])    COLONOSCOPY N/A 2023    Procedure: COLONOSCOPY to cecum with cold forcep polypectomies;  Surgeon: Costa Painting MD;  Location: [facility] ENDOSCOPY;  Service: General;  Laterality: N/A;  pre- screening  post- polyps, hemorrhoids    DENTAL PROCEDURE         Family History  Family History   Problem Relation Age of Onset    Kidney disease Mother         My mom is 90 years old with renal disease    Diabetes Mother     Heart disease Father          last July 3, 1969. My father  when he was 69 years old    Diabetes Brother         Youngest brother    Hypertension Sister         She has maintenance meds. Aby is my younger sister    Diabetes Maternal Grandfather         He  with diabetes complications. He was 69 years old    Diabetes Brother         He also has high cholesterol issues    Heart disease Brother         Recently  2023 (58 years old) due to cardiac arrest    Heart disease Maternal Aunt          due to heart failure. She was 75    Kidney disease Maternal Uncle         He was on dialysis. He  when he was 57 years old       Social History  Social History    Tobacco Use      Smoking status: Former        Packs/day: 0.00        Years: 0.3 packs/day for 22.0 years (5.5 ttl pk-yrs)        Types: Cigarettes        Start date: 1980        Quit date: 2002        Years  since quittin.6      Smokeless tobacco: Never      Tobacco comments: 1 pack in two weeks       Is the Patient a current tobacco user? No    Allergies  No Known Allergies    Current Medications    Current Outpatient Medications:     aspirin 81 MG EC tablet, 1 tablet after breakfast, Disp: 90 tablet, Rfl: 3    vitamin B-12 (CYANOCOBALAMIN) 1000 MCG tablet, Take 1 tablet by mouth Daily., Disp: , Rfl:     Bempedoic Acid-Ezetimibe (Nexlizet) 180-10 MG tablet, TAKE 1 TABLET BY MOUTH DAILY, Disp: 30 tablet, Rfl: 5     Review of System  Review of Systems   Constitutional: Negative.    HENT: Negative.     Eyes: Negative.    Respiratory: Negative.     Cardiovascular: Negative.    Gastrointestinal: Negative.    Endocrine: Negative.    Genitourinary: Negative.    Musculoskeletal: Negative.    Skin: Negative.    Allergic/Immunologic: Negative.    Neurological: Negative.    Hematological: Negative.    Psychiatric/Behavioral: Negative.       I have reviewed and confirmed the accuracy of the ROS as documented by the MA/LPN/RN Damon Santiago MD    Vitals:    24 0747   BP: 140/80     Body mass index is 25.1 kg/m².    Objective     Physical Exam  Physical Exam  Vitals and nursing note reviewed.   Constitutional:       Appearance: He is well-developed.   HENT:      Head: Normocephalic and atraumatic.   Eyes:      Conjunctiva/sclera: Conjunctivae normal.   Cardiovascular:      Rate and Rhythm: Normal rate and regular rhythm.      Heart sounds: Normal heart sounds.   Pulmonary:      Effort: Pulmonary effort is normal.      Breath sounds: Normal breath sounds.   Abdominal:      General: Bowel sounds are normal.      Palpations: Abdomen is soft.   Musculoskeletal:         General: Normal range of motion.      Cervical back: Normal range of motion and neck supple.   Skin:     General: Skin is warm and dry.   Neurological:      Mental Status: He is alert and oriented to person, place, and time.   Psychiatric:         Behavior:  Behavior normal.           Damon Santiago MD  08/06/2024

## 2024-08-06 NOTE — PROGRESS NOTES
08/06/2024    Assessment & Plan   This pleasant 64-year-old presents at this time for physical examination.  He relates he is feeling well having had no major problems in the interim of visits.  He is currently being followed by Dr. Brett Chaney, cardiologist for hyperlipidemia.  He is currently on Nexlizet.    His blood pressure shows good control today initially at 140/86 on recheck by me in the left arm sitting position standard cuff was 134/76.    Regarding anticipatory guidance.  We discussed the importance of keeping his LDL cholesterol less than 100.    His last LDL done on 8/6 showed his LDL did decrease from a prior high of 161-89.    Note is made that he is an avid tennis and pickleball player playing several days a week.  His BMI is excellent at 25.1 and his weight is down 270 pounds from her prior 175 6 months ago.    The patient had some concerns regarding a possible skin lesion located but this was actually a well-healed biopsy scar.  It is unchanged from the past evaluations.    Diagnoses and all orders for this visit:    1. Anemia due to vitamin B12 deficiency, unspecified B12 deficiency type (Primary)  -     Vitamin B12; Future  -     Folate; Future    2. Iron deficiency anemia, unspecified iron deficiency anemia type  -     CBC & Differential    3. Hyperlipidemia, unspecified hyperlipidemia type  -     Lipid Panel With / Chol / HDL Ratio    4. Screening for hypertension  -     Comprehensive Metabolic Panel    5. Encounter for physical examination    Other orders  -     Folate  -     Vitamin B12      Plan:  1.)  Follow-up in 5 to 6 months.           CC: Annual Exam  .        HPI  History of Present Illness     Subjective   Herb Melgar is a 64 y.o. male.      The following portions of the patient's history were reviewed and updated as appropriate: allergies, current medications, past family history, past medical history, past social history, past surgical history, and problem  list.    Problem List  Patient Active Problem List   Diagnosis    Bone lesion    Glenoid labrum tear    Osteoarthritis, shoulder    Rotator cuff syndrome of right shoulder    Shoulder pain    Screening for malignant neoplasm of colon    Hyperlipidemia LDL goal <100    Statin intolerance       Past Medical History  Past Medical History:   Diagnosis Date    Hyperlipidemia        Surgical History  Past Surgical History:   Procedure Laterality Date    COLONOSCOPY  2013    Dr. Jarad Howe completed the Colonoscopy Report ([facility])    COLONOSCOPY N/A 2023    Procedure: COLONOSCOPY to cecum with cold forcep polypectomies;  Surgeon: Costa Painting MD;  Location: [facility] ENDOSCOPY;  Service: General;  Laterality: N/A;  pre- screening  post- polyps, hemorrhoids    DENTAL PROCEDURE         Family History  Family History   Problem Relation Age of Onset    Kidney disease Mother         My mom is 90 years old with renal disease    Diabetes Mother     Heart disease Father          last July 3, 1969. My father  when he was 69 years old    Diabetes Brother         Youngest brother    Hypertension Sister         She has maintenance meds. Aby is my younger sister    Diabetes Maternal Grandfather         He  with diabetes complications. He was 69 years old    Diabetes Brother         He also has high cholesterol issues    Heart disease Brother         Recently  2023 (58 years old) due to cardiac arrest    Heart disease Maternal Aunt          due to heart failure. She was 75    Kidney disease Maternal Uncle         He was on dialysis. He  when he was 57 years old       Social History  Social History    Tobacco Use      Smoking status: Former        Packs/day: 0.00        Years: 0.3 packs/day for 22.0 years (5.5 ttl pk-yrs)        Types: Cigarettes        Start date: 1980        Quit date: 2002        Years since quittin.6      Smokeless tobacco: Never       Tobacco comments: 1 pack in two weeks       Is the Patient a current tobacco user? No    Allergies  No Known Allergies    Current Medications    Current Outpatient Medications:     aspirin 81 MG EC tablet, 1 tablet after breakfast, Disp: 90 tablet, Rfl: 3    vitamin B-12 (CYANOCOBALAMIN) 1000 MCG tablet, Take 1 tablet by mouth Daily., Disp: , Rfl:     Bempedoic Acid-Ezetimibe (Nexlizet) 180-10 MG tablet, TAKE 1 TABLET BY MOUTH DAILY, Disp: 30 tablet, Rfl: 5     Review of System  Review of Systems   Constitutional: Negative.    HENT: Negative.     Eyes: Negative.    Respiratory: Negative.     Cardiovascular: Negative.    Gastrointestinal: Negative.    Endocrine: Negative.    Genitourinary: Negative.    Musculoskeletal: Negative.    Skin: Negative.    Allergic/Immunologic: Negative.    Neurological: Negative.    Hematological: Negative.    Psychiatric/Behavioral: Negative.       I have reviewed and confirmed the accuracy of the ROS as documented by the MA/LPN/RN aDmon Santiago MD    Vitals:    08/06/24 0747   BP: 140/80     Body mass index is 25.1 kg/m².    Objective     Physical Exam  Physical Exam  Vitals and nursing note reviewed.   Constitutional:       Appearance: He is well-developed.   HENT:      Head: Normocephalic and atraumatic.   Eyes:      Conjunctiva/sclera: Conjunctivae normal.   Cardiovascular:      Rate and Rhythm: Normal rate and regular rhythm.      Heart sounds: Normal heart sounds.   Pulmonary:      Effort: Pulmonary effort is normal.      Breath sounds: Normal breath sounds.   Abdominal:      General: Bowel sounds are normal.      Palpations: Abdomen is soft.   Musculoskeletal:         General: Normal range of motion.      Cervical back: Normal range of motion and neck supple.   Skin:     General: Skin is warm and dry.   Neurological:      Mental Status: He is alert and oriented to person, place, and time.   Psychiatric:         Behavior: Behavior normal.             Damno Santiago  MD  08/06/2024    ASSESS    PLAN:    See Orders.     - - - - -

## 2024-08-16 RX ORDER — BEMPEDOIC ACID AND EZETIMIBE 180; 10 MG/1; MG/1
1 TABLET, FILM COATED ORAL DAILY
Qty: 30 TABLET | Refills: 5 | Status: SHIPPED | OUTPATIENT
Start: 2024-08-16

## 2024-09-16 RX ORDER — ASPIRIN 81 MG/1
TABLET ORAL
Qty: 90 TABLET | Refills: 3 | Status: SHIPPED | OUTPATIENT
Start: 2024-09-16

## 2024-12-05 ENCOUNTER — OFFICE VISIT (OUTPATIENT)
Dept: CARDIOLOGY | Facility: CLINIC | Age: 64
End: 2024-12-05
Payer: COMMERCIAL

## 2024-12-05 VITALS
DIASTOLIC BLOOD PRESSURE: 74 MMHG | HEIGHT: 69 IN | HEART RATE: 74 BPM | BODY MASS INDEX: 25.77 KG/M2 | WEIGHT: 174 LBS | SYSTOLIC BLOOD PRESSURE: 132 MMHG

## 2024-12-05 DIAGNOSIS — E78.5 HYPERLIPIDEMIA LDL GOAL <100: Primary | ICD-10-CM

## 2024-12-05 DIAGNOSIS — Z78.9 STATIN INTOLERANCE: ICD-10-CM

## 2024-12-05 PROCEDURE — 99213 OFFICE O/P EST LOW 20 MIN: CPT | Performed by: INTERNAL MEDICINE

## 2024-12-05 PROCEDURE — 93000 ELECTROCARDIOGRAM COMPLETE: CPT | Performed by: INTERNAL MEDICINE

## 2024-12-05 NOTE — PROGRESS NOTES
1 YR FOLLOW UP   Subjective:        Herb Melgar is a 64 y.o. male who here for follow up    CC  Follow-up hyperlipidemia statin intolerant  HPI  64-year-old female here for the follow-up denies any chest pains or tightness in the chest     Problems Addressed this Visit          Allergies and Adverse Reactions    Statin intolerance       Cardiac and Vasculature    Hyperlipidemia LDL goal <100 - Primary     Diagnoses         Codes Comments    Hyperlipidemia LDL goal <100    -  Primary ICD-10-CM: E78.5  ICD-9-CM: 272.4     Statin intolerance     ICD-10-CM: Z78.9  ICD-9-CM: 995.27           .    The following portions of the patient's history were reviewed and updated as appropriate: allergies, current medications, past family history, past medical history, past social history, past surgical history and problem list.    Past Medical History:   Diagnosis Date    Hyperlipidemia      reports that he quit smoking about 22 years ago. His smoking use included cigarettes. He started smoking about 44 years ago. He has a 5.5 pack-year smoking history. He has been exposed to tobacco smoke. He has never used smokeless tobacco. He reports current alcohol use of about 3.0 standard drinks of alcohol per week. He reports that he does not use drugs.   Family History   Problem Relation Age of Onset    Kidney disease Mother         My mom is 90 years old with renal disease    Diabetes Mother     Heart disease Father          last July 3, 1969. My father  when he was 69 years old    Diabetes Brother         Youngest brother    Hypertension Sister         She has maintenance meds. Aby is my younger sister    Diabetes Maternal Grandfather         He  with diabetes complications. He was 69 years old    Diabetes Brother         He also has high cholesterol issues    Heart disease Brother         Recently  2023 (58 years old) due to cardiac arrest    Heart disease Maternal Aunt           "due to heart failure. She was 75    Kidney disease Maternal Uncle         He was on dialysis. He  when he was 57 years old       Review of Systems  Constitutional: No wt loss, fever, fatigue  Gastrointestinal: No nausea, abdominal pain  Behavioral/Psych: No insomnia or anxiety   Cardiovascular no chest pains or tightness in the chest  Objective:       Physical Exam  /74   Pulse 74   Ht 175.3 cm (69\")   Wt 78.9 kg (174 lb)   BMI 25.70 kg/m²   General appearance: No acute changes   Neck: Trachea midline; NECK, supple, no thyromegaly or lymphadenopathy   Lungs: Normal size and shape, normal breath sounds, equal distribution of air, no rales and rhonchi   CV: S1-S2 regular, no murmurs, no rub, no gallop   Abdomen: Soft, nontender; no masses , no abnormal abdominal sounds   Extremities: No deformity , normal color , no peripheral edema   Skin: Normal temperature, turgor and texture; no rash, ulcers            ECG 12 Lead    Date/Time: 2024 9:50 AM  Performed by: Olga North MD    Authorized by: Olga North MD  Comparison: compared with previous ECG   Similar to previous ECG  Rhythm: sinus rhythm    Clinical impression: non-specific ECG            Echocardiogram:    Results for orders placed in visit on 23    Adult Transthoracic Echo Complete W/ Cont if Necessary Per Protocol    Interpretation Summary    Left ventricular ejection fraction appears to be 61 - 65%.    Left ventricular diastolic function was normal.    Estimated right ventricular systolic pressure from tricuspid regurgitation is mildly elevated (35-45 mmHg).          Current Outpatient Medications:     aspirin (Aspirin Low Dose) 81 MG EC tablet, TAKE 1 TABLET BY MOUTH DAILY AFTER BREAKFAST, Disp: 90 tablet, Rfl: 3    Bempedoic Acid-Ezetimibe (Nexlizet) 180-10 MG tablet, TAKE 1 TABLET BY MOUTH DAILY, Disp: 30 tablet, Rfl: 5    vitamin B-12 (CYANOCOBALAMIN) 1000 MCG tablet, Take 1 tablet by mouth Daily., Disp: , " Rfl:    Assessment:                Plan:          ICD-10-CM ICD-9-CM   1. Hyperlipidemia LDL goal <100  E78.5 272.4   2. Statin intolerance  Z78.9 995.27     1. Hyperlipidemia LDL goal <100  Much better with next visit    2. Statin intolerance  Has been switched over to Nexlizet at      Reduce asa 81 mg    1 yr  COUNSELING:    Herb Carroll was given to patient for the following topics: diagnostic results, risk factor reductions, impressions, risks and benefits of treatment options and importance of treatment compliance .       SMOKING COUNSELING:        Dictated using Dragon dictation

## 2025-02-17 RX ORDER — BEMPEDOIC ACID AND EZETIMIBE 180; 10 MG/1; MG/1
1 TABLET, FILM COATED ORAL DAILY
Qty: 30 TABLET | Refills: 10 | Status: SHIPPED | OUTPATIENT
Start: 2025-02-17

## 2025-03-18 ENCOUNTER — TELEPHONE (OUTPATIENT)
Dept: FAMILY MEDICINE CLINIC | Facility: CLINIC | Age: 65
End: 2025-03-18

## 2025-03-18 ENCOUNTER — OFFICE VISIT (OUTPATIENT)
Dept: FAMILY MEDICINE CLINIC | Facility: CLINIC | Age: 65
End: 2025-03-18
Payer: COMMERCIAL

## 2025-03-18 VITALS
WEIGHT: 172 LBS | SYSTOLIC BLOOD PRESSURE: 130 MMHG | HEIGHT: 69 IN | OXYGEN SATURATION: 98 % | DIASTOLIC BLOOD PRESSURE: 86 MMHG | BODY MASS INDEX: 25.48 KG/M2 | HEART RATE: 74 BPM

## 2025-03-18 DIAGNOSIS — E78.5 HYPERLIPIDEMIA, UNSPECIFIED HYPERLIPIDEMIA TYPE: ICD-10-CM

## 2025-03-18 DIAGNOSIS — Z78.9 STATIN INTOLERANCE: Primary | ICD-10-CM

## 2025-03-18 DIAGNOSIS — E78.5 HYPERLIPIDEMIA, UNSPECIFIED HYPERLIPIDEMIA TYPE: Primary | ICD-10-CM

## 2025-03-18 LAB
CHOLEST SERPL-MCNC: 167 MG/DL (ref 0–200)
HDLC SERPL-MCNC: 57 MG/DL (ref 40–60)
LDLC SERPL CALC-MCNC: 99 MG/DL (ref 0–100)
TRIGL SERPL-MCNC: 54 MG/DL (ref 0–150)
VLDLC SERPL CALC-MCNC: 11 MG/DL (ref 5–40)

## 2025-03-18 PROCEDURE — 99213 OFFICE O/P EST LOW 20 MIN: CPT | Performed by: INTERNAL MEDICINE

## 2025-03-18 NOTE — PROGRESS NOTES
03/18/2025    My Summary of Visit   This pleasant retired  presents at this time for follow-up of hyperlipidemia.  He been seen in the past for Dr. North and is currently on Nexlizet  180/10 mg daily for a LDL that was as high as 153 with poor response from statins.  He is very active in his retired life playing pickle ball 4-5 times weekly.  The last LDL done on 8/2024 was down to 89.  He relates he is feeling well having no muscle aches or pains.    We discussed the importance of stretching well before his pickleball and other related exercises.      Assessment & Plan  1. Hyperlipidemia.  His LDL levels have shown significant improvement, indicating the effectiveness of Nexletol. He reports no side effects such as muscle aches or pains. A cholesterol level test will be conducted today to monitor his progress. He is advised to continue his current medication regimen and maintain his active lifestyle, including stretching exercises to prevent muscle injuries.    2. Health maintenance.  He is advised to receive the pneumonia vaccine, given his age of 65 years. He should check with his insurance provider to see if the vaccine is covered. If not, he may wait until he qualifies for Medicare.    Follow-up  The patient will follow up in 6 months.    Results  Laboratory Studies  LDL levels were very good.    BMI is >= 25 and <30. (Overweight) The following options were offered after discussion;: exercise counseling/recommendations           CC: Hyperlipidemia (F/U---no other issues)  .        HPI  Hyperlipidemia  Pertinent negatives include no chest pain or myalgias.    History of Present Illness  The patient is a 65-year-old male who presents for evaluation of hyperlipidemia and health maintenance.    He reports a significant improvement in his condition with the use of Nexletol, which he has been taking consistently at a dosage of one tablet daily. He has not experienced any adverse effects from the  medication. He has been engaging in physical activity, specifically pickleball, approximately 4 to 5 days per week. However, he acknowledges the need for caution due to previous muscle strains. To mitigate this, he has incorporated stretching exercises into his routine, including online rehabilitation and stretching programs, [employer], and chair yoga. He also dedicates 30 minutes to stretching before his pickleball sessions. He recalls a history of knee injuries from his youth when he was actively involved in basketball.    He has not yet received the pneumonia vaccine and is currently not covered by Medicare. He is fasting today and has a scheduled appointment with  in December 2024. During his last consultation with , his EKG results were within normal limits, showing a normal sinus rhythm.    MEDICATIONS  Nexletol.    IMMUNIZATIONS  He has never had a pneumonia vaccine.    Dony Melgar is a 65 y.o. male.      The following portions of the patient's history were reviewed and updated as appropriate: allergies, current medications, past family history, past medical history, past social history, past surgical history, and problem list.    Problem List  Patient Active Problem List   Diagnosis   • Bone lesion   • Glenoid labrum tear   • Osteoarthritis, shoulder   • Rotator cuff syndrome of right shoulder   • Shoulder pain   • Screening for malignant neoplasm of colon   • Hyperlipidemia LDL goal <100   • Statin intolerance       Past Medical History  Past Medical History:   Diagnosis Date   • Hyperlipidemia 2022       Surgical History  Past Surgical History:   Procedure Laterality Date   • COLONOSCOPY  07/26/2013    Dr. Jarad Howe completed the Colonoscopy Report ([facility])   • COLONOSCOPY N/A 9/12/2023    Procedure: COLONOSCOPY to cecum with cold forcep polypectomies;  Surgeon: Costa Painting MD;  Location: [facility] ENDOSCOPY;  Service: General;  Laterality: N/A;  pre- screening  post- polyps,  hemorrhoids   • DENTAL PROCEDURE         Family History  Family History   Problem Relation Age of Onset   • Kidney disease Mother         My mom is 90 years old with renal disease   • Diabetes Mother    • Heart disease Father          last July 3, 1969. My father  when he was 69 years old   • Diabetes Brother         Youngest brother   • Hypertension Sister         She has maintenance meds. Aby is my younger sister   • Diabetes Maternal Grandfather         He  with diabetes complications. He was 69 years old   • Diabetes Brother         He also has high cholesterol issues   • Heart disease Brother         Recently  2023 (58 years old) due to cardiac arrest   • Heart disease Maternal Aunt          due to heart failure. She was 75   • Kidney disease Maternal Uncle         He was on dialysis. He  when he was 57 years old       Social History  Social History    Tobacco Use      Smoking status: Former        Packs/day: 0.00        Years: 0.3 packs/day for 22.0 years (5.5 ttl pk-yrs)        Types: Cigarettes        Start date: 1980        Quit date: 2002        Years since quittin.2        Passive exposure: Past      Smokeless tobacco: Never      Tobacco comments: 1 pack in two weeks       Is the Patient a current tobacco user? No    Allergies  No Known Allergies    Current Medications    Current Outpatient Medications:   •  aspirin (Aspirin Low Dose) 81 MG EC tablet, TAKE 1 TABLET BY MOUTH DAILY AFTER BREAKFAST, Disp: 90 tablet, Rfl: 3  •  Bempedoic Acid-Ezetimibe (Nexlizet) 180-10 MG tablet, TAKE 1 TABLET BY MOUTH DAILY, Disp: 30 tablet, Rfl: 10  •  vitamin B-12 (CYANOCOBALAMIN) 1000 MCG tablet, Take 1 tablet by mouth Daily., Disp: , Rfl:      Review of System  Review of Systems   Constitutional:  Negative for chills, diaphoresis, fatigue and fever.   HENT:  Negative for congestion, sore throat and swollen glands.    Respiratory:  Negative for cough.     Cardiovascular:  Negative for chest pain.   Gastrointestinal:  Negative for abdominal pain, nausea and vomiting.   Genitourinary:  Negative for dysuria.   Musculoskeletal:  Negative for myalgias and neck pain.   Skin:  Negative for rash.   Neurological:  Negative for weakness and numbness.   I have reviewed and confirmed the accuracy of the ROS as documented by the MA/LPN/RN Damon Santiago MD    Vitals:    03/18/25 0832   BP: 130/86   Pulse: 74   SpO2: 98%     Body mass index is 25.4 kg/m².    Objective     Physical Exam  Physical Exam  Constitutional:       Appearance: Normal appearance.   HENT:      Head: Normocephalic and atraumatic.   Cardiovascular:      Rate and Rhythm: Normal rate.      Pulses: Normal pulses.   Musculoskeletal:         General: Normal range of motion.      Cervical back: Normal range of motion.   Neurological:      Mental Status: He is alert.       Patient or patient representative verbalized consent for the use of Ambient Listening during the visit with  Damon Santiago MD for chart documentation. 3/18/2025  09:35 EDT    Damon Santiago MD  03/18/2025

## 2025-03-19 LAB
ALBUMIN SERPL-MCNC: 5.1 G/DL (ref 3.5–5.2)
ALBUMIN/GLOB SERPL: 1.6 G/DL
ALP SERPL-CCNC: 64 U/L (ref 39–117)
ALT SERPL-CCNC: 28 U/L (ref 1–41)
AST SERPL-CCNC: 39 U/L (ref 1–40)
BILIRUB SERPL-MCNC: 0.6 MG/DL (ref 0–1.2)
BUN SERPL-MCNC: 10 MG/DL (ref 8–23)
BUN/CREAT SERPL: 9.5 (ref 7–25)
CALCIUM SERPL-MCNC: 9.7 MG/DL (ref 8.6–10.5)
CHLORIDE SERPL-SCNC: 103 MMOL/L (ref 98–107)
CO2 SERPL-SCNC: 24.9 MMOL/L (ref 22–29)
CREAT SERPL-MCNC: 1.05 MG/DL (ref 0.76–1.27)
EGFRCR SERPLBLD CKD-EPI 2021: 79.8 ML/MIN/1.73
GLOBULIN SER CALC-MCNC: 3.1 GM/DL
GLUCOSE SERPL-MCNC: 97 MG/DL (ref 65–99)
POTASSIUM SERPL-SCNC: 4.5 MMOL/L (ref 3.5–5.2)
PROT SERPL-MCNC: 8.2 G/DL (ref 6–8.5)
SODIUM SERPL-SCNC: 142 MMOL/L (ref 136–145)
SPECIMEN STATUS: NORMAL

## 2025-03-25 ENCOUNTER — RESULTS FOLLOW-UP (OUTPATIENT)
Dept: FAMILY MEDICINE CLINIC | Facility: CLINIC | Age: 65
End: 2025-03-25
Payer: COMMERCIAL

## 2025-03-25 NOTE — LETTER
Herb Melgar  [address] [facility] [address]    March 26, 2025     Dear Mr. Melgar:    Below are the results from your recent visit:    Resulted Orders   Lipid panel   Result Value Ref Range    Total Cholesterol 167 0 - 200 mg/dL      Comment:      Cholesterol Reference Ranges  (U.S[place] Department of Health and Human Services ATP III  Classifications)  Desirable          <200 mg/dL  Borderline High    200-239 mg/dL  High Risk          >240 mg/dL  Triglyceride Reference Ranges  (U.S. Department of Health and Human Services ATP III  Classifications)  Normal           <150 mg/dL  Borderline High  150-199 mg/dL  High             200-499 mg/dL  Very High        >500 mg/dL  HDL Reference Ranges  (U.S. Department of Health and Human Services ATP III  Classifications)  Low     <40 mg/dl (major risk factor for CHD)  High    >60 mg/dl ('negative' risk factor for CHD)  LDL Reference Ranges  (U.S. Department of Health and Human Services ATP III  Classifications)  Optimal          <100 mg/dL  Near Optimal     100-129 mg/dL  Borderline High  130-159 mg/dL  High             160-189 mg/dL  Very High        >189 mg/dL  LDL is calculated using the NIH LDL-C calculation.      Triglycerides 54 0 - 150 mg/dL    HDL Cholesterol 57 40 - 60 mg/dL    VLDL Cholesterol Reggie 11 5 - 40 mg/dL    LDL Chol Calc (NIH) 99 0 - 100 mg/dL   Specimen Status Report   Result Value Ref Range    Specimen Status Comment       Comment:      Written Authorization  Written Authorization  Written Authorization Received.  Authorization received from Sruthi Mcarthur for Link Request on 03-  Logged by Alexis Roy     Comprehensive Metabolic Panel   Result Value Ref Range    Glucose 97 65 - 99 mg/dL    BUN 10 8 - 23 mg/dL    Creatinine 1.05 0.76 - 1.27 mg/dL    EGFR Result 79.8 >60.0 mL/min/1.73      Comment:      GFR Categories in Chronic Kidney Disease (CKD)/X09/  /X09/  GFR Category          GFR (mL/min/1.73)    Interpretation  G1/X09/                     90 or greater/X09/        Normal or high  (1)  G2//                    60-89                Mild decrease  (1)  G3a                   45-59                Mild to moderate  decrease  G3b                   30-44                Moderate to  severe decrease  G4                    15-29                Severe decrease  G5                    14 or less           Kidney failure//  /[id number]/  (1)In the absence of evidence of kidney disease, neither  GFR category G1 or G2 fulfill the criteria for CKD.  eGFR calculation 2021 CKD-EPI creatinine equation, which  does not include race as a factor      BUN/Creatinine Ratio 9.5 7.0 - 25.0    Sodium 142 136 - 145 mmol/L    Potassium 4.5 3.5 - 5.2 mmol/L    Chloride 103 98 - 107 mmol/L    Total CO2 24.9 22.0 - 29.0 mmol/L    Calcium 9.7 8.6 - 10.5 mg/dL    Total Protein 8.2 6.0 - 8.5 g/dL    Albumin 5.1 3.5 - 5.2 g/dL    Globulin 3.1 gm/dL    A/G Ratio 1.6 g/dL    Total Bilirubin 0.6 0.0 - 1.2 mg/dL    Alkaline Phosphatase 64 39 - 117 U/L    AST (SGOT) 39 1 - 40 U/L    ALT (SGPT) 28 1 - 41 U/L       Your LDL is at goal less than 100.     If you have any questions or concerns, please don't hesitate to call.         Sincerely,        Damon Santiago MD

## 2025-06-26 ENCOUNTER — TELEPHONE (OUTPATIENT)
Dept: FAMILY MEDICINE CLINIC | Facility: CLINIC | Age: 65
End: 2025-06-26

## 2025-06-26 NOTE — TELEPHONE ENCOUNTER
Caller: Herb Melgar    Relationship: Self    Best call back number:   [phone]   What is the best time to reach you: ANYTIME    Who are you requesting to speak with (clinical staff, provider,  specific staff member): CLINICAL    Do you know the name of the person who called: JOSE     What was the call regarding: PATIENT RETURNING CALL    Is it okay if the provider responds through MyChart: NO

## (undated) DEVICE — CANN O2 ETCO2 FITS ALL CONN CO2 SMPL A/ 7IN DISP LF

## (undated) DEVICE — KT ORCA ORCAPOD DISP STRL

## (undated) DEVICE — SENSR O2 OXIMAX FNGR A/ 18IN NONSTR

## (undated) DEVICE — TUBING, SUCTION, 1/4" X 10', STRAIGHT: Brand: MEDLINE

## (undated) DEVICE — LN SMPL CO2 SHTRM SD STREAM W/M LUER

## (undated) DEVICE — ADAPT CLN BIOGUARD AIR/H2O DISP